# Patient Record
Sex: FEMALE | Race: WHITE | URBAN - METROPOLITAN AREA
[De-identification: names, ages, dates, MRNs, and addresses within clinical notes are randomized per-mention and may not be internally consistent; named-entity substitution may affect disease eponyms.]

---

## 2022-08-10 ENCOUNTER — APPOINTMENT (RX ONLY)
Dept: URBAN - METROPOLITAN AREA CLINIC 377 | Facility: CLINIC | Age: 87
Setting detail: DERMATOLOGY
End: 2022-08-10

## 2022-08-10 DIAGNOSIS — L82.0 INFLAMED SEBORRHEIC KERATOSIS: ICD-10-CM

## 2022-08-10 DIAGNOSIS — L81.4 OTHER MELANIN HYPERPIGMENTATION: ICD-10-CM | Status: STABLE

## 2022-08-10 DIAGNOSIS — D18.0 HEMANGIOMA: ICD-10-CM | Status: STABLE

## 2022-08-10 DIAGNOSIS — Z85.828 PERSONAL HISTORY OF OTHER MALIGNANT NEOPLASM OF SKIN: ICD-10-CM

## 2022-08-10 DIAGNOSIS — L82.1 OTHER SEBORRHEIC KERATOSIS: ICD-10-CM | Status: STABLE

## 2022-08-10 DIAGNOSIS — D22 MELANOCYTIC NEVI: ICD-10-CM | Status: STABLE

## 2022-08-10 DIAGNOSIS — Z71.89 OTHER SPECIFIED COUNSELING: ICD-10-CM

## 2022-08-10 PROBLEM — D22.5 MELANOCYTIC NEVI OF TRUNK: Status: ACTIVE | Noted: 2022-08-10

## 2022-08-10 PROBLEM — D18.01 HEMANGIOMA OF SKIN AND SUBCUTANEOUS TISSUE: Status: ACTIVE | Noted: 2022-08-10

## 2022-08-10 PROCEDURE — 99203 OFFICE O/P NEW LOW 30 MIN: CPT | Mod: 25

## 2022-08-10 PROCEDURE — ? COUNSELING

## 2022-08-10 PROCEDURE — ? SUNSCREEN TREATMENT REGIMEN

## 2022-08-10 PROCEDURE — ? LIQUID NITROGEN

## 2022-08-10 PROCEDURE — 17110 DESTRUCTION B9 LES UP TO 14: CPT

## 2022-08-10 ASSESSMENT — LOCATION SIMPLE DESCRIPTION DERM
LOCATION SIMPLE: UPPER BACK
LOCATION SIMPLE: RIGHT NOSE
LOCATION SIMPLE: LEFT CHEEK
LOCATION SIMPLE: RIGHT FOREHEAD
LOCATION SIMPLE: RIGHT CHEEK
LOCATION SIMPLE: LEFT UPPER BACK

## 2022-08-10 ASSESSMENT — LOCATION DETAILED DESCRIPTION DERM
LOCATION DETAILED: LEFT SUPERIOR CENTRAL BUCCAL CHEEK
LOCATION DETAILED: LEFT SUPERIOR UPPER BACK
LOCATION DETAILED: LEFT SUPERIOR MEDIAL UPPER BACK
LOCATION DETAILED: RIGHT LATERAL MANDIBULAR CHEEK
LOCATION DETAILED: SUPERIOR THORACIC SPINE
LOCATION DETAILED: RIGHT FOREHEAD
LOCATION DETAILED: LEFT MEDIAL UPPER BACK
LOCATION DETAILED: RIGHT NASAL SIDEWALL

## 2022-08-10 ASSESSMENT — LOCATION ZONE DERM
LOCATION ZONE: FACE
LOCATION ZONE: NOSE
LOCATION ZONE: TRUNK

## 2022-08-10 NOTE — PROCEDURE: MIPS QUALITY
Detail Level: Detailed
Quality 110: Preventive Care And Screening: Influenza Immunization: Influenza Immunization Administered during Influenza season
Quality 226: Preventive Care And Screening: Tobacco Use: Screening And Cessation Intervention: Patient screened for tobacco use and is an ex/non-smoker
Quality 111:Pneumonia Vaccination Status For Older Adults: Pneumococcal vaccine administered on or after patient’s 60th birthday and before the end of the measurement period
Quality 130: Documentation Of Current Medications In The Medical Record: Current Medications Documented
Quality 431: Preventive Care And Screening: Unhealthy Alcohol Use - Screening: Patient identified as an unhealthy alcohol user when screened for unhealthy alcohol use using a systematic screening method and received brief counseling
Detail Level: Simple
Detail Level: Generalized

## 2022-08-10 NOTE — PROCEDURE: LIQUID NITROGEN
Show Aperture Variable?: Yes
Add 52 Modifier (Optional): no
Consent: The patient's consent was obtained including but not limited to risks of crusting, scabbing, blistering, scarring, darker or lighter pigmentary change, recurrence, incomplete removal and infection.
Medical Necessity Information: It is in your best interest to select a reason for this procedure from the list below. All of these items fulfill various CMS LCD requirements except the new and changing color options.
Medical Necessity Clause: This procedure was medically necessary because the lesions that were treated were:
Detail Level: Detailed
Spray Paint Text: The liquid nitrogen was applied to the skin utilizing a spray paint frosting technique.
Post-Care Instructions: I reviewed with the patient in detail post-care instructions. Patient is to wear sunprotection, and avoid picking at any of the treated lesions. Pt may apply Vaseline to crusted or scabbing areas.

## 2022-08-31 ENCOUNTER — TELEPHONE (OUTPATIENT)
Dept: FAMILY MEDICINE CLINIC | Age: 87
End: 2022-08-31

## 2022-10-17 ENCOUNTER — OFFICE VISIT (OUTPATIENT)
Dept: FAMILY MEDICINE CLINIC | Age: 87
End: 2022-10-17
Payer: COMMERCIAL

## 2022-10-17 VITALS
SYSTOLIC BLOOD PRESSURE: 140 MMHG | TEMPERATURE: 96.6 F | OXYGEN SATURATION: 96 % | BODY MASS INDEX: 28.27 KG/M2 | DIASTOLIC BLOOD PRESSURE: 86 MMHG | HEART RATE: 89 BPM | HEIGHT: 64 IN | WEIGHT: 165.6 LBS

## 2022-10-17 DIAGNOSIS — F32.0 CURRENT MILD EPISODE OF MAJOR DEPRESSIVE DISORDER WITHOUT PRIOR EPISODE (HCC): ICD-10-CM

## 2022-10-17 DIAGNOSIS — Z87.19 HISTORY OF ESOPHAGEAL REFLUX: ICD-10-CM

## 2022-10-17 DIAGNOSIS — D68.51 FACTOR V LEIDEN MUTATION (HCC): ICD-10-CM

## 2022-10-17 DIAGNOSIS — Z86.711 HISTORY OF PULMONARY EMBOLUS (PE): ICD-10-CM

## 2022-10-17 DIAGNOSIS — M16.0 PRIMARY OSTEOARTHRITIS OF BOTH HIPS: Primary | ICD-10-CM

## 2022-10-17 DIAGNOSIS — M85.89 OSTEOPENIA OF MULTIPLE SITES: ICD-10-CM

## 2022-10-17 DIAGNOSIS — Z86.718 HISTORY OF RECURRENT DEEP VEIN THROMBOSIS (DVT): ICD-10-CM

## 2022-10-17 DIAGNOSIS — E53.8 B12 DEFICIENCY: ICD-10-CM

## 2022-10-17 DIAGNOSIS — Z76.89 ESTABLISHING CARE WITH NEW DOCTOR, ENCOUNTER FOR: ICD-10-CM

## 2022-10-17 PROBLEM — Z79.01 ANTICOAGULATED: Status: ACTIVE | Noted: 2022-10-17

## 2022-10-17 PROBLEM — F32.A DEPRESSION: Status: ACTIVE | Noted: 2022-10-17

## 2022-10-17 PROCEDURE — 99204 OFFICE O/P NEW MOD 45 MIN: CPT | Performed by: FAMILY MEDICINE

## 2022-10-17 PROCEDURE — 1123F ACP DISCUSS/DSCN MKR DOCD: CPT | Performed by: FAMILY MEDICINE

## 2022-10-17 RX ORDER — VENLAFAXINE HYDROCHLORIDE 37.5 MG/1
37.5 CAPSULE, EXTENDED RELEASE ORAL DAILY
Qty: 90 CAPSULE | Refills: 1 | Status: SHIPPED | OUTPATIENT
Start: 2022-10-17

## 2022-10-17 RX ORDER — VENLAFAXINE HYDROCHLORIDE 37.5 MG/1
CAPSULE, EXTENDED RELEASE ORAL
COMMUNITY
End: 2022-10-17 | Stop reason: SDUPTHER

## 2022-10-17 RX ORDER — RIVAROXABAN 20 MG/1
TABLET, FILM COATED ORAL
COMMUNITY
Start: 2022-09-13

## 2022-10-17 RX ORDER — ALENDRONATE SODIUM 70 MG/1
TABLET ORAL
COMMUNITY
Start: 2022-09-19 | End: 2022-10-17 | Stop reason: SDUPTHER

## 2022-10-17 RX ORDER — ALENDRONATE SODIUM 70 MG/1
TABLET ORAL
Qty: 12 TABLET | Refills: 3 | Status: SHIPPED | OUTPATIENT
Start: 2022-10-17

## 2022-10-18 PROBLEM — K57.92 DIVERTICULITIS: Status: ACTIVE | Noted: 2022-10-18

## 2022-10-18 PROBLEM — D68.51 FACTOR V LEIDEN MUTATION (HCC): Status: ACTIVE | Noted: 2022-10-18

## 2022-10-18 ASSESSMENT — PATIENT HEALTH QUESTIONNAIRE - PHQ9
3. TROUBLE FALLING OR STAYING ASLEEP: 0
SUM OF ALL RESPONSES TO PHQ QUESTIONS 1-9: 0
6. FEELING BAD ABOUT YOURSELF - OR THAT YOU ARE A FAILURE OR HAVE LET YOURSELF OR YOUR FAMILY DOWN: 0
SUM OF ALL RESPONSES TO PHQ QUESTIONS 1-9: 0
SUM OF ALL RESPONSES TO PHQ9 QUESTIONS 1 & 2: 0
SUM OF ALL RESPONSES TO PHQ QUESTIONS 1-9: 0
SUM OF ALL RESPONSES TO PHQ QUESTIONS 1-9: 0
2. FEELING DOWN, DEPRESSED OR HOPELESS: 0
1. LITTLE INTEREST OR PLEASURE IN DOING THINGS: 0
8. MOVING OR SPEAKING SO SLOWLY THAT OTHER PEOPLE COULD HAVE NOTICED. OR THE OPPOSITE, BEING SO FIGETY OR RESTLESS THAT YOU HAVE BEEN MOVING AROUND A LOT MORE THAN USUAL: 0
5. POOR APPETITE OR OVEREATING: 0
9. THOUGHTS THAT YOU WOULD BE BETTER OFF DEAD, OR OF HURTING YOURSELF: 0
4. FEELING TIRED OR HAVING LITTLE ENERGY: 0
7. TROUBLE CONCENTRATING ON THINGS, SUCH AS READING THE NEWSPAPER OR WATCHING TELEVISION: 0
10. IF YOU CHECKED OFF ANY PROBLEMS, HOW DIFFICULT HAVE THESE PROBLEMS MADE IT FOR YOU TO DO YOUR WORK, TAKE CARE OF THINGS AT HOME, OR GET ALONG WITH OTHER PEOPLE: 0

## 2022-10-18 NOTE — PROGRESS NOTES
Patient here to establish care. Patient recently moved here from [facility] [place]. Patient with multiple medical problems. First and foremost per patient is her osteoarthritis. She has osteoarthritis in both hips. Because she is on Xarelto for recurrent DVTs and PEs, (apparently patient has factor V Leyden deficiency) for she is unable to take NSAIDs, so she has been getting corticosteroid injections in both her hips about every 3 months through orthopedics. Because she recently moved, she needs a new referral to a local orthopedist.  Current pain is about 5/10 because its been about 8 or 9 months since her last injection. Patient with depression currently on Effexor 37.5 mg.  Her previous physician had her taking 2 tablets in the morning and 1 tablet in the evening. Patient denies any suicidal homicidal ideations. Her mood seems to be fairly well controlled on the current dose. Sleeping okay. Patient with osteopenia per patient. She has been on Fosamax for many years. She needs a refill. Tolerates well without side effects    Patient with a history of B12 deficiency for which she takes over-the-counter B12 medication. Feels well. Patient with a history of laryngeal pharyngeal reflux disease. She has not been taking medication for this for years and denies any symptoms currently. Patient with a history of recurrent diverticulitis for which she has had 2 colon surgeries. Has not had an attack in many years.     Past Surgical History:   Procedure Laterality Date    ANKLE FRACTURE SURGERY  1976    APPENDECTOMY Bilateral 06/16/2009    CATARACT REMOVAL WITH IMPLANT Bilateral 12/21/2009    CORNEAL TRANSPLANT      12/21/2009    CYSTOCELE REPAIR  06/16/2009    HEMORRHOID SURGERY      RECTOCELE REPAIR  06/16/2009    SIGMOID COLECTOMY  06/16/2009    SMALL INTESTINE SURGERY  06/16/2009    JUANJOSE AND BSO (CERVIX REMOVED)  06/16/2009     Past Medical History:   Diagnosis Date    Anticoagulated     B12 deficiency     Depression     Diverticulosis     History of esophageal reflux     History of pulmonary embolus (PE)     History of recurrent deep vein thrombosis (DVT)     HTN (hypertension)     IBS (irritable bowel syndrome)     OA (osteoarthritis) of hip      O:   Vitals:    10/17/22 1311   BP: (!) 140/86   Pulse: 89   Temp: (!) 96.6 °F (35.9 °C)   SpO2: 96%     No acute distress. Alert and Oriented x 3  HEENT: Atraumatic. Normocephalic. PERRLA, EOMI, Conjunctiva clear  NECK: without thyromegaly, lymphadenopathy, JVD  LUNGS:Clear to ascultation bilaterally. Breathing comfortably  CARDIOVASCULAR:  Regular rate and rhythm, no murmurs, rubs, or gallops  EXTREMITY: Full range of motion. No clubbing/cyanosis/edema  SKIN: Warm, Dry, No rash. PSYCH: Mood and Affect normal.    A:    Diagnosis Orders   1. Primary osteoarthritis of both hips  [address] Snyder DO, Orthopedic Surgery, [place]      2. Current mild episode of major depressive disorder without prior episode (HCC)  venlafaxine (EFFEXOR XR) 37.5 MG extended release capsule      3. Osteopenia of multiple sites  alendronate (FOSAMAX) 70 MG tablet      4. Factor V Leiden mutation (HCC)  rivaroxaban (XARELTO) 20 MG TABS tablet      5. History of recurrent deep vein thrombosis (DVT)  rivaroxaban (XARELTO) 20 MG TABS tablet      6. History of pulmonary embolus (PE)  rivaroxaban (XARELTO) 20 MG TABS tablet      7. History of esophageal reflux        8. B12 deficiency        9. Establishing care with new doctor, encounter for          P: Continue all meds at their current dose. Consult to orthopedics for hip osteoarthritis.   Follow-up 6 months

## 2022-10-27 ENCOUNTER — OFFICE VISIT (OUTPATIENT)
Dept: ORTHOPEDIC SURGERY | Age: 87
End: 2022-10-27
Payer: COMMERCIAL

## 2022-10-27 VITALS
SYSTOLIC BLOOD PRESSURE: 128 MMHG | RESPIRATION RATE: 16 BRPM | WEIGHT: 167 LBS | OXYGEN SATURATION: 94 % | BODY MASS INDEX: 28.51 KG/M2 | HEIGHT: 64 IN | DIASTOLIC BLOOD PRESSURE: 70 MMHG | HEART RATE: 72 BPM

## 2022-10-27 DIAGNOSIS — M16.12 ARTHRITIS OF LEFT HIP: Primary | ICD-10-CM

## 2022-10-27 DIAGNOSIS — M16.11 ARTHRITIS OF RIGHT HIP: ICD-10-CM

## 2022-10-27 PROCEDURE — 1123F ACP DISCUSS/DSCN MKR DOCD: CPT | Performed by: PHYSICIAN ASSISTANT

## 2022-10-27 PROCEDURE — 99202 OFFICE O/P NEW SF 15 MIN: CPT | Performed by: PHYSICIAN ASSISTANT

## 2022-10-27 NOTE — PROGRESS NOTES
Review of Systems   Constitutional: Negative. HENT: Negative. Eyes: Negative. Respiratory: Negative. Cardiovascular: Negative. Gastrointestinal: Negative. Genitourinary: Negative. Musculoskeletal:  Positive for arthralgias and myalgias. Skin: Negative. Neurological: Negative. Psychiatric/Behavioral: Negative. Ailyn Thompson is an 80 y.o. female who presents to the office today with bilateral hip pain that she has had for years. She states that in the past she has had injections by an orthopedic surgeon and that those injections were done under x-ray guidance into the hip joints. She states that she was told she was not a good candidate for surgery due to her blood clotting disorder. She has a history of a DVT in the left leg. She has not had a fall or anything recent injury wise but continues to have pain in both hips. She rates her pain at a 7/10, aching in nature. Past Medical History:   Diagnosis Date    Anticoagulated     B12 deficiency     Depression     Diverticulosis     History of esophageal reflux     History of pulmonary embolus (PE)     History of recurrent deep vein thrombosis (DVT)     HTN (hypertension)     IBS (irritable bowel syndrome)     OA (osteoarthritis) of hip        Past Surgical History:   Procedure Laterality Date    ANKLE FRACTURE SURGERY  1976    APPENDECTOMY Bilateral 06/16/2009    CATARACT REMOVAL WITH IMPLANT Bilateral 12/21/2009    CORNEAL TRANSPLANT      12/21/2009    CYSTOCELE REPAIR  06/16/2009    HEMORRHOID SURGERY      RECTOCELE REPAIR  06/16/2009    SIGMOID COLECTOMY  06/16/2009    SMALL INTESTINE SURGERY  06/16/2009    JUANJOSE AND BSO (CERVIX REMOVED)  06/16/2009       History reviewed. No pertinent family history.     Social History     Socioeconomic History    Marital status: Unknown     Spouse name: None    Number of children: None    Years of education: None    Highest education level: None   Tobacco Use    Smoking status: Never    Smokeless tobacco: Never   Vaping Use    Vaping Use: Never used   Substance and Sexual Activity    Alcohol use: Never    Drug use: Never       Current Outpatient Medications   Medication Sig Dispense Refill    Calcium Carbonate-Vitamin D (CALCIUM-VITAMIN D) 600-125 MG-UNIT TABS       Multiple Vitamin (MULTI-VITAMIN DAILY PO)       venlafaxine (EFFEXOR XR) 37.5 MG extended release capsule Take 1 capsule by mouth daily 90 capsule 1    rivaroxaban (XARELTO) 20 MG TABS tablet Take 1 tablet by mouth daily (with breakfast) 90 tablet 1    alendronate (FOSAMAX) 70 MG tablet 1 TABLET ORALLY ONCE A WEEK 90 DAYS 12 tablet 3    XARELTO 20 MG TABS tablet TAKE 1 TABLET BY MOUTH EVERY DAY WITH FOOD FOR 30 DAYS (Patient not taking: Reported on 10/27/2022)       No current facility-administered medications for this visit. Allergies   Allergen Reactions    Codeine Other (See Comments)    Vitamin K     Penicillin G Rash     Event:        Sulfa Antibiotics Rash     Event:         Review of Systems:  See above      Physical Exam:   /70 (Site: Left Upper Arm, Position: Sitting, Cuff Size: Medium Adult)   Pulse 72   Resp 16   Ht 5' 3.5\" (1.613 m)   Wt 167 lb (75.8 kg)   SpO2 94%   BMI 29.12 kg/m²        Gait is Antalgic. Gen/Psych:Examination reveals a pleasant individual in no acute distress. The patient is oriented to time, place and person. The patient's mood and affect are appropriate. Lymph: The lymphatic examination bilaterally reveals all areas to be without enlargement or induration. Skin intact without lymphadenopathy, discoloration, or abnormal temperature. Vascular: There is intact, symmetric circulation in both lower extremities. Bilateral hip exam:  No significant tenderness to palpation over the left or right greater trochanter. Leg lengths are approximately equal.  Patient does have pain with internal and external rotation of both hips.   There is approximately 20 degrees external rotation and 10 degrees internal rotation of both hips. 5/5 hip abduction, 5/5 hip adduction, 5-/5 hip flexion. Outsiderecord review: Medical record reviewed    Imaging studies:  X-rays of the left hip and of the right hip were taken and reviewed in the office today. X-rays show moderate degenerative change in both hips with joint space loss, osteophyte formation and subchondral sclerosis. No acute fractures or dislocations of either hip noted. The official read and interpretation of these x-rays will be done by the the [facility]           Impression:     Diagnosis Orders   1. Arthritis of left hip  IR ARTHROCENTESIS MAJOR JT LEFT      2.  Arthritis of right hip  IR ARTHROCENTESIS MAJOR JT RIGHT              Plan:    Patient Instructions   IR hip injections ordered for both hips   Follow up in 6 weeks

## 2022-10-30 ASSESSMENT — ENCOUNTER SYMPTOMS
RESPIRATORY NEGATIVE: 1
EYES NEGATIVE: 1
GASTROINTESTINAL NEGATIVE: 1

## 2022-11-04 ENCOUNTER — TELEPHONE (OUTPATIENT)
Dept: ORTHOPEDIC SURGERY | Age: 87
End: 2022-11-04

## 2022-12-20 NOTE — PROGRESS NOTES
IR Procedure at [facility]:  Left message for patient to arrive at 0930 at [facility] on 12/28/2022 for her procedure at 1130, also went over below instructions. Ask patient to check with her Dr About Xavier Aguilar. I will check back with her tomorrow. ADDENDUM: Patient's daughter called back and stated that patient is supposed to have both hips injected, I told her to call the Dr's office that ordered procedure to make sure they let scheduling know. And she will also check about stopping xarelto. NPO at Midnight     (Paracentesis, Thoracentesis, Thyroid Bx and Injections may have a light breakfast)  2. Follow your directions as prescribed by the doctor for your procedure and medications. 3.   Consult your provider as to when to stop blood thinner  4. Do not take any pain medication within 6 hours of your procedure  5. Do not drink any alcoholic beverages or use any street drugs 24 hours before procedure. 6.   Please wear simple, loose fitting clothing to the hospital.  Do not bring valuables (money,             credit cards, checkbooks, etc.)     7. If you  have a Living Will and Durable Power of  for Healthcare, please bring in a copy. 8.   Please bring picture ID,  insurance card, paperwork from the doctors office            (H & P, Consent,  & card for implantable devices). 9.   Report to the information desk on the ground floor. 10. Take a shower the night before or morning of your procedure, do not apply any lotion, oil or powder. 11. If you are going to be sedated for the procedure, you will need a responsible adult to drive you home.

## 2022-12-27 NOTE — PROGRESS NOTES
IR Procedure at [address] [place] [address]:  Spoke with patient's daughter Vasiliy Santos and patient will arrive at 0730 at [address] [place] [address] on 12/30/2022 for her procedure at 0930. Also went over below instructions. Patient will take her last dose of xarelto on 12/28/2022, per patient's daughter. NPO at Midnight     (Paracentesis, Thoracentesis, Thyroid Bx and Injections may have a light breakfast)  2. Follow your directions as prescribed by the doctor for your procedure and medications. 3.   Consult your provider as to when to stop blood thinner  4. Do not take any pain medication within 6 hours of your procedure  5. Do not drink any alcoholic beverages or use any street drugs 24 hours before procedure. 6.   Please wear simple, loose fitting clothing to the hospital.  Do not bring valuables (money,             credit cards, checkbooks, etc.)     7. If you  have a Living Will and Durable Power of  for Healthcare, please bring in a copy. 8.   Please bring picture ID,  insurance card, paperwork from the doctors office            (H & P, Consent,  & card for implantable devices). 9.   Report to the information desk on the ground floor. 10. Take a shower the night before or morning of your procedure, do not apply any lotion, oil or powder. 11. If you are going to be sedated for the procedure, you will need a responsible adult to drive you home.

## 2022-12-28 ENCOUNTER — HOSPITAL ENCOUNTER (OUTPATIENT)
Dept: INTERVENTIONAL RADIOLOGY/VASCULAR | Age: 87
Discharge: HOME | End: 2022-12-28

## 2022-12-30 ENCOUNTER — HOSPITAL ENCOUNTER (OUTPATIENT)
Dept: INTERVENTIONAL RADIOLOGY/VASCULAR | Age: 87
Discharge: HOME | End: 2022-12-30

## 2022-12-30 NOTE — PROGRESS NOTES
Spoke with patient's daughter Shirley Quiroz and patient is not coming for her procedure, she did not stop her xarelto. IR notified.

## 2023-02-20 ENCOUNTER — APPOINTMENT (OUTPATIENT)
Dept: CT IMAGING | Age: 88
DRG: 065 | End: 2023-02-20
Payer: MEDICARE

## 2023-02-20 ENCOUNTER — HOSPITAL ENCOUNTER (INPATIENT)
Age: 88
LOS: 2 days | Discharge: HOME | DRG: 065 | End: 2023-02-22
Attending: EMERGENCY MEDICINE | Admitting: STUDENT IN AN ORGANIZED HEALTH CARE EDUCATION/TRAINING PROGRAM
Payer: MEDICARE

## 2023-02-20 ENCOUNTER — APPOINTMENT (OUTPATIENT)
Dept: GENERAL RADIOLOGY | Age: 88
DRG: 065 | End: 2023-02-20
Payer: MEDICARE

## 2023-02-20 DIAGNOSIS — R42 LIGHTHEADEDNESS: ICD-10-CM

## 2023-02-20 DIAGNOSIS — I16.1 HYPERTENSIVE EMERGENCY, NO CHF: Primary | ICD-10-CM

## 2023-02-20 LAB
ANION GAP SERPL CALCULATED.3IONS-SCNC: 15 MMOL/L (ref 4–16)
ATYPICAL LYMPHOCYTE ABSOLUTE COUNT: ABNORMAL
BANDED NEUTROPHILS ABSOLUTE COUNT: 0.14 K/CU MM
BANDED NEUTROPHILS RELATIVE PERCENT: 1 % (ref 5–11)
BUN SERPL-MCNC: 22 MG/DL (ref 6–23)
CALCIUM SERPL-MCNC: 10 MG/DL (ref 8.3–10.6)
CHLORIDE BLD-SCNC: 101 MMOL/L (ref 99–110)
CO2: 21 MMOL/L (ref 21–32)
CREAT SERPL-MCNC: 0.7 MG/DL (ref 0.6–1.1)
DIFFERENTIAL TYPE: ABNORMAL
EKG ATRIAL RATE: 84 BPM
EKG DIAGNOSIS: NORMAL
EKG P AXIS: 39 DEGREES
EKG P-R INTERVAL: 200 MS
EKG Q-T INTERVAL: 410 MS
EKG QRS DURATION: 108 MS
EKG QTC CALCULATION (BAZETT): 484 MS
EKG R AXIS: -15 DEGREES
EKG T AXIS: 41 DEGREES
EKG VENTRICULAR RATE: 84 BPM
GFR SERPL CREATININE-BSD FRML MDRD: >60 ML/MIN/1.73M2
GLUCOSE SERPL-MCNC: 111 MG/DL (ref 70–99)
HCT VFR BLD CALC: 50.3 % (ref 37–47)
HEMOGLOBIN: 16.4 GM/DL (ref 12.5–16)
LYMPHOCYTES ABSOLUTE: 2.4 K/CU MM
LYMPHOCYTES RELATIVE PERCENT: 17 % (ref 24–44)
MCH RBC QN AUTO: 29.9 PG (ref 27–31)
MCHC RBC AUTO-ENTMCNC: 32.6 % (ref 32–36)
MCV RBC AUTO: 91.6 FL (ref 78–100)
MONOCYTES ABSOLUTE: 1.7 K/CU MM
MONOCYTES RELATIVE PERCENT: 12 % (ref 0–4)
PDW BLD-RTO: 14.6 % (ref 11.7–14.9)
PLATELET # BLD: 346 K/CU MM (ref 140–440)
PLT MORPHOLOGY: ABNORMAL
PMV BLD AUTO: 10.7 FL (ref 7.5–11.1)
POTASSIUM SERPL-SCNC: 4 MMOL/L (ref 3.5–5.1)
RBC # BLD: 5.49 M/CU MM (ref 4.2–5.4)
SEGMENTED NEUTROPHILS ABSOLUTE COUNT: 10.2 K/CU MM
SEGMENTED NEUTROPHILS RELATIVE PERCENT: 70 % (ref 36–66)
SODIUM BLD-SCNC: 137 MMOL/L (ref 135–145)
TROPONIN T: <0.01 NG/ML
TROPONIN T: <0.01 NG/ML
WBC # BLD: 14.4 K/CU MM (ref 4–10.5)

## 2023-02-20 PROCEDURE — 70498 CT ANGIOGRAPHY NECK: CPT

## 2023-02-20 PROCEDURE — 99285 EMERGENCY DEPT VISIT HI MDM: CPT

## 2023-02-20 PROCEDURE — 93005 ELECTROCARDIOGRAM TRACING: CPT | Performed by: EMERGENCY MEDICINE

## 2023-02-20 PROCEDURE — 93010 ELECTROCARDIOGRAM REPORT: CPT | Performed by: INTERNAL MEDICINE

## 2023-02-20 PROCEDURE — 6370000000 HC RX 637 (ALT 250 FOR IP): Performed by: EMERGENCY MEDICINE

## 2023-02-20 PROCEDURE — 92610 EVALUATE SWALLOWING FUNCTION: CPT

## 2023-02-20 PROCEDURE — 85027 COMPLETE CBC AUTOMATED: CPT

## 2023-02-20 PROCEDURE — 6360000002 HC RX W HCPCS: Performed by: EMERGENCY MEDICINE

## 2023-02-20 PROCEDURE — 6360000004 HC RX CONTRAST MEDICATION: Performed by: STUDENT IN AN ORGANIZED HEALTH CARE EDUCATION/TRAINING PROGRAM

## 2023-02-20 PROCEDURE — 94761 N-INVAS EAR/PLS OXIMETRY MLT: CPT

## 2023-02-20 PROCEDURE — 85007 BL SMEAR W/DIFF WBC COUNT: CPT

## 2023-02-20 PROCEDURE — 6370000000 HC RX 637 (ALT 250 FOR IP): Performed by: NURSE PRACTITIONER

## 2023-02-20 PROCEDURE — 6370000000 HC RX 637 (ALT 250 FOR IP): Performed by: STUDENT IN AN ORGANIZED HEALTH CARE EDUCATION/TRAINING PROGRAM

## 2023-02-20 PROCEDURE — 71045 X-RAY EXAM CHEST 1 VIEW: CPT

## 2023-02-20 PROCEDURE — 36415 COLL VENOUS BLD VENIPUNCTURE: CPT

## 2023-02-20 PROCEDURE — 84484 ASSAY OF TROPONIN QUANT: CPT

## 2023-02-20 PROCEDURE — 70450 CT HEAD/BRAIN W/O DYE: CPT

## 2023-02-20 PROCEDURE — 87040 BLOOD CULTURE FOR BACTERIA: CPT

## 2023-02-20 PROCEDURE — 1200000000 HC SEMI PRIVATE

## 2023-02-20 PROCEDURE — 80048 BASIC METABOLIC PNL TOTAL CA: CPT

## 2023-02-20 PROCEDURE — 99223 1ST HOSP IP/OBS HIGH 75: CPT | Performed by: PSYCHIATRY & NEUROLOGY

## 2023-02-20 PROCEDURE — 96374 THER/PROPH/DIAG INJ IV PUSH: CPT

## 2023-02-20 RX ORDER — ONDANSETRON 2 MG/ML
4 INJECTION INTRAMUSCULAR; INTRAVENOUS EVERY 6 HOURS PRN
Status: DISCONTINUED | OUTPATIENT
Start: 2023-02-20 | End: 2023-02-22 | Stop reason: HOSPADM

## 2023-02-20 RX ORDER — ONDANSETRON 4 MG/1
4 TABLET, ORALLY DISINTEGRATING ORAL EVERY 8 HOURS PRN
Status: DISCONTINUED | OUTPATIENT
Start: 2023-02-20 | End: 2023-02-22 | Stop reason: HOSPADM

## 2023-02-20 RX ORDER — ROSUVASTATIN CALCIUM 20 MG/1
40 TABLET, COATED ORAL NIGHTLY
Status: DISCONTINUED | OUTPATIENT
Start: 2023-02-20 | End: 2023-02-22 | Stop reason: HOSPADM

## 2023-02-20 RX ORDER — ASPIRIN 300 MG/1
300 SUPPOSITORY RECTAL DAILY
Status: DISCONTINUED | OUTPATIENT
Start: 2023-02-20 | End: 2023-02-22 | Stop reason: HOSPADM

## 2023-02-20 RX ORDER — LANOLIN ALCOHOL/MO/W.PET/CERES
3 CREAM (GRAM) TOPICAL ONCE
Status: COMPLETED | OUTPATIENT
Start: 2023-02-20 | End: 2023-02-20

## 2023-02-20 RX ORDER — HYDRALAZINE HYDROCHLORIDE 20 MG/ML
10 INJECTION INTRAMUSCULAR; INTRAVENOUS ONCE
Status: COMPLETED | OUTPATIENT
Start: 2023-02-20 | End: 2023-02-20

## 2023-02-20 RX ORDER — ACETAMINOPHEN 500 MG
1000 TABLET ORAL ONCE
Status: COMPLETED | OUTPATIENT
Start: 2023-02-20 | End: 2023-02-20

## 2023-02-20 RX ORDER — ROSUVASTATIN CALCIUM 20 MG/1
40 TABLET, COATED ORAL NIGHTLY
Status: DISCONTINUED | OUTPATIENT
Start: 2023-02-20 | End: 2023-02-20

## 2023-02-20 RX ORDER — POLYETHYLENE GLYCOL 3350 17 G/17G
17 POWDER, FOR SOLUTION ORAL DAILY PRN
Status: DISCONTINUED | OUTPATIENT
Start: 2023-02-20 | End: 2023-02-22 | Stop reason: HOSPADM

## 2023-02-20 RX ORDER — ASPIRIN 81 MG/1
81 TABLET ORAL DAILY
Status: DISCONTINUED | OUTPATIENT
Start: 2023-02-20 | End: 2023-02-22 | Stop reason: HOSPADM

## 2023-02-20 RX ORDER — VENLAFAXINE HYDROCHLORIDE 37.5 MG/1
37.5 CAPSULE, EXTENDED RELEASE ORAL DAILY
Status: DISCONTINUED | OUTPATIENT
Start: 2023-02-20 | End: 2023-02-22 | Stop reason: HOSPADM

## 2023-02-20 RX ADMIN — ACETAMINOPHEN 1000 MG: 500 TABLET ORAL at 03:11

## 2023-02-20 RX ADMIN — Medication 3 MG: at 20:47

## 2023-02-20 RX ADMIN — ASPIRIN 81 MG: 81 TABLET, COATED ORAL at 13:14

## 2023-02-20 RX ADMIN — HYDRALAZINE HYDROCHLORIDE 10 MG: 20 INJECTION INTRAMUSCULAR; INTRAVENOUS at 02:39

## 2023-02-20 RX ADMIN — ROSUVASTATIN CALCIUM 40 MG: 20 TABLET, COATED ORAL at 20:47

## 2023-02-20 RX ADMIN — VENLAFAXINE HYDROCHLORIDE 37.5 MG: 37.5 CAPSULE, EXTENDED RELEASE ORAL at 15:19

## 2023-02-20 RX ADMIN — RIVAROXABAN 20 MG: 20 TABLET, FILM COATED ORAL at 13:14

## 2023-02-20 RX ADMIN — IOPAMIDOL 80 ML: 755 INJECTION, SOLUTION INTRAVENOUS at 11:30

## 2023-02-20 ASSESSMENT — ENCOUNTER SYMPTOMS
SORE THROAT: 0
COUGH: 0
NAUSEA: 0
SINUS PAIN: 0
BACK PAIN: 0
DIARRHEA: 0
EYES NEGATIVE: 1
SINUS PRESSURE: 0
ABDOMINAL PAIN: 0
VOMITING: 0
RESPIRATORY NEGATIVE: 1
SHORTNESS OF BREATH: 0
CONSTIPATION: 0
GASTROINTESTINAL NEGATIVE: 1
COLOR CHANGE: 0
WHEEZING: 0

## 2023-02-20 NOTE — H&P
V2.0  History and Physical      Name:  Lyric Reyes /Age/Sex: 1933  (80 y.o. female)   MRN & CSN:  [id number] & [id number] Encounter Date/Time: 2023 11:12 AM EST   Location:  7957/7089-[id number] PCP: Stefanie Gibson, [address] [facility] Day: 1    History from:     patient    History of Present Illness:     Chief Complaint: Hypertensive emergency    Lyric Reyes is a 80 y.o. female with pmh of factor V Leiden with prior PE/DVTs [on Xarelto], who presents with dizziness, blurry vision unsteady gait, and a headache. Patient states that the symptoms been ongoing for the past 2 days. Patient denies any falls and has not hit her head. At baseline she does not take any medications nor has she for blood pressure. On arrival patient was found to be hypertensive to the systolic of 774L over 475E. She was given a one-time dose of IV hydralazine that brought her blood pressure down into a systolic in the 846Z. Patient stated that this helped her headache. As patient does have a history of multiple clots there is concern for stroke. She did have a CT head which is reassuring that it was negative as her symptoms have been ongoing for the past 2 days. The only other symptom the patient does endorse that she has had some increased urinary frequency without burning. Assessment and Plan:   Lyric Reyes is a 80 y.o. female with a pmh of factor V Leiden with prior PE/DVTs [on Xarelto], who presents with dizziness, blurry vision unsteady gait, and a headache who presents with Hypertensive emergency    Hypertensive emergency  Patient with a presenting blood pressure in the systolics of 116E. Does have associated blurry vision, dizziness, and unsteady gait.   Not on any blood pressure medications at baseline  Cardene drip titrate to blood pressure of 180  We will allow her to remain hypertensive and neurology for at least the next 24 hours  Avoid dropping her blood pressure as this could result in worsening of a stroke if there is a clot. Dizziness with unsteady gait, R/O CVA  Differential includes stroke or due to hypertensive emergency. The order of operations areas with support and as a clot could cause her to be hypertensive. CT head was okay  Neurology consulted  CTA was ordered stat to visualize vasculature and see if there is a clot. MRI brain ordered  Start patient on aspirin statin  Patient having a blood pressure of 180 being targeted for hypertensive emergency. Still want her to be unregulated device because of bleed nor too low and resulted in a larger stroke. Factor V Leyden  History of multiple DVTs as well as a PE  Continue Xarelto    ? Urinary tract infection  Patient with increased urinary frequency without burning. Patient does have an elevated white count of 14. But denies fever or chills. We will check a UA and if positive initiate treatment with IV antibiotics. Disposition:   Current Living situation: Home  Expected Disposition: Home   Estimated D/C: 3-4 days    Diet Diet NPO   DVT Prophylaxis [] Lovenox, []  Heparin, [] SCDs, [] Ambulation,  [] Eliquis, [x] Xarelto   Code Status Full Code   Surrogate Decision Maker/ POA       Review of Systems: Need 10 Elements   Review of Systems   Constitutional:  Negative for activity change, appetite change, chills, fatigue and fever. HENT:  Negative for congestion, sinus pressure, sinus pain and sore throat. Eyes:  Negative for visual disturbance. Respiratory:  Negative for cough, shortness of breath and wheezing. Cardiovascular:  Negative for chest pain, palpitations and leg swelling. Gastrointestinal:  Negative for abdominal pain, constipation, diarrhea, nausea and vomiting. Endocrine: Negative for polydipsia and polyuria. Genitourinary:  Negative for dysuria. Musculoskeletal:  Negative for arthralgias and back pain. Skin:  Negative for color change.    Neurological:  Positive for dizziness, weakness and headaches. Psychiatric/Behavioral:  Negative for agitation, behavioral problems and confusion. Objective:   No intake or output data in the 24 hours ending 02/20/23 1112   Vitals:   Vitals:    02/20/23 0800 02/20/23 0944 02/20/23 1000 02/20/23 1104   BP: (!) 171/88 (!) 175/75 (!) 171/88    Pulse: 84 98 83    Resp: 17 14 16    Temp:  98 °F (36.7 °C)     TempSrc:  Oral     SpO2: 95% 96% 94%    Weight:       Height:    5' 3\" (1.6 m)       Medications Prior to Admission     Prior to Admission medications    Medication Sig Start Date End Date Taking? Authorizing Provider   Calcium Carbonate-Vitamin D (CALCIUM-VITAMIN D) 600-125 MG-UNIT TABS     Historical Provider, MD   XARELTO 20 MG TABS tablet TAKE 1 TABLET BY MOUTH EVERY DAY WITH FOOD FOR 30 DAYS  Patient not taking: Reported on 10/27/2022 9/13/22   Historical Provider, MD   Multiple Vitamin (MULTI-VITAMIN DAILY PO)     Historical Provider, MD   venlafaxine (EFFEXOR XR) 37.5 MG extended release capsule Take 1 capsule by mouth daily 10/17/22   Sadia Adame MD   rivaroxaban Jerlyn Orf) 20 MG TABS tablet Take 1 tablet by mouth daily (with breakfast) 10/17/22   Sadia Adame MD   alendronate (FOSAMAX) 70 MG tablet 1 TABLET ORALLY ONCE A WEEK 90 DAYS 10/17/22   Sadia Adame MD       Physical Exam: Need 8 Elements     Physical Exam  Vitals reviewed. Constitutional:       Appearance: Normal appearance. She is normal weight. HENT:      Head: Normocephalic and atraumatic. Mouth/Throat:      Mouth: Mucous membranes are moist.      Pharynx: Oropharynx is clear. Eyes:      Extraocular Movements: Extraocular movements intact. Conjunctiva/sclera: Conjunctivae normal.      Pupils: Pupils are equal, round, and reactive to light. Cardiovascular:      Rate and Rhythm: Normal rate and regular rhythm. Pulses: Normal pulses. Heart sounds: Normal heart sounds.    Pulmonary:      Effort: Pulmonary effort is normal.      Breath sounds: Normal breath sounds. Abdominal:      General: Abdomen is flat. Bowel sounds are normal.      Palpations: Abdomen is soft. Musculoskeletal:         General: No swelling. Normal range of motion. Cervical back: Normal range of motion and neck supple. Skin:     General: Skin is warm and dry. Neurological:      General: No focal deficit present. Mental Status: She is alert and oriented to person, place, and time. Mental status is at baseline. Psychiatric:         Mood and Affect: Mood normal.         Behavior: Behavior normal.         Thought Content: Thought content normal.         Judgment: Judgment normal.       Past Medical History:   PMHx   Past Medical History:   Diagnosis Date    Anticoagulated     B12 deficiency     Depression     Diverticulosis     History of esophageal reflux     History of pulmonary embolus (PE)     History of recurrent deep vein thrombosis (DVT)     HTN (hypertension)     IBS (irritable bowel syndrome)     OA (osteoarthritis) of hip      PSHX:  has a past surgical history that includes Total abdominal hysterectomy w/ bilateral salpingoophorectomy (06/16/2009); Cataract removal with implant (Bilateral, 12/21/2009); Corneal transplant; Hemorrhoid surgery; Ankle fracture surgery (1976); Cystocele repair (06/16/2009); Rectocele repair (06/16/2009); Appendectomy (Bilateral, 06/16/2009); Small intestine surgery (06/16/2009); and Sigmoid Colectomy (06/16/2009). Allergies: Allergies   Allergen Reactions    Codeine Other (See Comments)    Vitamin K     Penicillin G Rash     Event:        Sulfa Antibiotics Rash     Event:       Fam HX:  family history is not on file.   Soc HX:   Social History     Socioeconomic History    Marital status: Unknown     Spouse name: None    Number of children: None    Years of education: None    Highest education level: None   Tobacco Use    Smoking status: Never    Smokeless tobacco: Never   Vaping Use    Vaping Use: Never used   Substance and Sexual Activity Alcohol use: Never    Drug use: Never       Medications:   Medications:    venlafaxine  37.5 mg Oral Daily    aspirin  81 mg Oral Daily    Or    aspirin  300 mg Rectal Daily    rivaroxaban  20 mg Oral Daily with breakfast    rosuvastatin  40 mg Oral Nightly      Infusions:    niCARdipine       PRN Meds: ondansetron, 4 mg, Q8H PRN   Or  ondansetron, 4 mg, Q6H PRN  polyethylene glycol, 17 g, Daily PRN        Labs      CBC:   Recent Labs     02/20/23  0235   WBC 14.4*   HGB 16.4*        BMP:    Recent Labs     02/20/23  0235      K 4.0      CO2 21   BUN 22   CREATININE 0.7   GLUCOSE 111*     Hepatic: No results for input(s): AST, ALT, ALB, BILITOT, ALKPHOS in the last 72 hours. Lipids: No results found for: CHOL, HDL, TRIG  Hemoglobin A1C: No results found for: LABA1C  TSH: No results found for: TSH  Troponin:   Lab Results   Component Value Date/Time    TROPONINT <0.010 02/20/2023 02:35 AM     Lactic Acid: No results for input(s): LACTA in the last 72 hours. BNP: No results for input(s): PROBNP in the last 72 hours. UA:No results found for: Teresia Racer, PHUR, LABCAST, WBCUA, RBCUA, MUCUS, TRICHOMONAS, YEAST, BACTERIA, CLARITYU, SPECGRAV, LEUKOCYTESUR, UROBILINOGEN, BILIRUBINUR, BLOODU, GLUCOSEU, KETUA, AMORPHOUS  Urine Cultures: No results found for: LABURIN  Blood Cultures: No results found for: BC  No results found for: BLOODCULT2  Organism: No results found for: ORG    Imaging/Diagnostics Last 24 Hours   CT HEAD WO CONTRAST    Result Date: 2/20/2023  EXAMINATION: CT OF THE HEAD WITHOUT CONTRAST  2/20/2023 3:03 am TECHNIQUE: CT of the head was performed without the administration of intravenous contrast. Automated exposure control, iterative reconstruction, and/or weight based adjustment of the mA/kV was utilized to reduce the radiation dose to as low as reasonably achievable. COMPARISON: None.  HISTORY: ORDERING SYSTEM PROVIDED HISTORY: head pain TECHNOLOGIST PROVIDED HISTORY: Has a \"code stroke\" or \"stroke alert\" been called? ->No Reason for exam:->head pain Reason for Exam: Head pain, High BP FINDINGS: BRAIN/VENTRICLES: There is no acute intracranial hemorrhage, mass effect or midline shift. No abnormal extra-axial fluid collection. The gray-white differentiation is maintained without evidence of an acute infarct. There is no evidence of hydrocephalus. Moderate periventricular and deep subcortical white matter hypodensity is present. Diffuse atrophy is noted. ORBITS: The visualized portion of the orbits demonstrate no acute abnormality. SINUSES: The visualized paranasal sinuses and mastoid air cells demonstrate no acute abnormality. SOFT TISSUES/SKULL:  No acute abnormality of the visualized skull or soft tissues. No acute intracranial abnormality. Age related changes including chronic small vessel ischemic disease and cerebral atrophy. XR CHEST PORTABLE    Result Date: 2/20/2023  EXAMINATION: ONE XRAY VIEW OF THE CHEST 2/20/2023 3:01 am COMPARISON: None. HISTORY: ORDERING SYSTEM PROVIDED HISTORY: chest pain TECHNOLOGIST PROVIDED HISTORY: Reason for exam:->chest pain Reason for Exam: chest pain FINDINGS: No lines or tubes. Normal cardiomediastinal silhouette. The lungs are clear without focal consolidation or pleural effusion. No suspicious pulmonary nodules. No pulmonary edema. No pneumothorax. No acute osseous abnormality. No acute cardiopulmonary disease. Personally reviewed Lab Studies, Imaging, and discussed case with patient    Comment: Please note this report has been produced using speech recognition software and may contain errors related to that system including errors in grammar, punctuation, and spelling, as well as words and phrases that may be inappropriate. If there are any questions or concerns please feel free to contact the dictating provider for clarification.     Electronically signed by Tigre Yoder MD on 2/20/2023 at 11:12 AM

## 2023-02-20 NOTE — PLAN OF CARE
Problem: Discharge Planning  Goal: Discharge to home or other facility with appropriate resources  Outcome: Progressing     Problem: Safety - Adult  Goal: Free from fall injury  Outcome: Progressing     Problem: ABCDS Injury Assessment  Goal: Absence of physical injury  Outcome: Progressing   Blood pressor decreasing on own.

## 2023-02-20 NOTE — CONSULTS
Neurology Service Consult Note  [address]   Patient Name: Bozena Maldonado  : 1933        Subjective:   Reason for consult: Stroke v HTN emergency with complaints of headache and unsteadiness  80 y.o. female  with history of B12 deficiency, depression, dierticulitis, IBS, HTN, factor V Leiden, DVT/PE on Xarelto presenting to [place] [address] with a 2 day history of dizziness, headache and \"fuzzy feeling\" in her head. Patient reports that she has not slept well for several nights. While she does go to sleep she doesn't feel that she is fully asleep. She states that she is also had a bifrontal/temporal headache and just feels like she is not thinking clearly. The patient states that she has not been on any antihypertensives prior to this hospitalization and her PCP was managing her blood pressure remotely. It is had a steady increase over the last year or so. Patient is also struggling emotionally as her  passed away last September and their wedding anniversary was just on . She is relocated from [facility] down to the [facility] to be closer to her daughter and is living in assisted living. She states that she likes it there and she has made lots of friends. As blood pressure has been normalizing, the patient states that she is feeling much better. She denies any vision changes, focal or lateralizing weakness or sensory changes. She did not have any loss of consciousness.       Past Medical History:   Diagnosis Date    Anticoagulated     B12 deficiency     Depression     Diverticulosis     History of esophageal reflux     History of pulmonary embolus (PE)     History of recurrent deep vein thrombosis (DVT)     HTN (hypertension)     IBS (irritable bowel syndrome)     OA (osteoarthritis) of hip     :   Past Surgical History:   Procedure Laterality Date    ANKLE FRACTURE SURGERY      APPENDECTOMY Bilateral 2009    CATARACT REMOVAL WITH IMPLANT Bilateral 12/21/2009    CORNEAL TRANSPLANT      12/21/2009    CYSTOCELE REPAIR  06/16/2009    HEMORRHOID SURGERY      RECTOCELE REPAIR  06/16/2009    SIGMOID COLECTOMY  06/16/2009    SMALL INTESTINE SURGERY  06/16/2009    JUANJOSE AND BSO (CERVIX REMOVED)  06/16/2009     Medications:  Scheduled Meds:   venlafaxine  37.5 mg Oral Daily    aspirin  81 mg Oral Daily    Or    aspirin  300 mg Rectal Daily    rivaroxaban  20 mg Oral Daily with breakfast    rosuvastatin  40 mg Oral Nightly     Continuous Infusions:   niCARdipine       PRN Meds:.ondansetron **OR** ondansetron, polyethylene glycol    Allergies   Allergen Reactions    Codeine Other (See Comments)    Vitamin K     Penicillin G Rash     Event:        Sulfa Antibiotics Rash     Event:       Social History     Socioeconomic History    Marital status: Unknown     Spouse name: Not on file    Number of children: Not on file    Years of education: Not on file    Highest education level: Not on file   Occupational History    Not on file   Tobacco Use    Smoking status: Never    Smokeless tobacco: Never   Vaping Use    Vaping Use: Never used   Substance and Sexual Activity    Alcohol use: Never    Drug use: Never    Sexual activity: Not on file   Other Topics Concern    Not on file   Social History Narrative    Not on file     Social Determinants of Health     Financial Resource Strain: Not on file   Food Insecurity: Not on file   Transportation Needs: Not on file   Physical Activity: Not on file   Stress: Not on file   Social Connections: Not on file   Intimate Partner Violence: Not on file   Housing Stability: Not on file      History reviewed. No pertinent family history.       ROS (10 systems)  In addition to that documented in the HPI above, the additional ROS was obtained:  Constitutional: Denies fevers or chills  Eyes: Denies vision changes  ENMT: Denies sore throat  CV: Denies chest pain  Resp: Denies SOB  GI: Denies vomiting or diarrhea  : Denies painful urination  MSK: Denies recent trauma  Skin: Denies new rashes  Neuro: \"Foggy\" thinking, headache, dizziness  Endocrine: Denies unexpected weight loss  Heme: Denies bleeding disorders    Physical Exam:      Wt Readings from Last 3 Encounters:   02/20/23 163 lb (73.9 kg)   10/27/22 167 lb (75.8 kg)   10/17/22 165 lb 9.6 oz (75.1 kg)     Temp Readings from Last 3 Encounters:   02/20/23 97.7 °F (36.5 °C) (Oral)   10/17/22 (!) 96.6 °F (35.9 °C) (Infrared)     BP Readings from Last 3 Encounters:   02/20/23 (!) 143/121   10/27/22 128/70   10/17/22 (!) 140/86     Pulse Readings from Last 3 Encounters:   02/20/23 83   10/27/22 72   10/17/22 89        Gen: A&O x 4, NAD, cooperative  HEENT: NC/AT, EOMI, PERRL, mmm, neck supple, no meningeal signs  Heart: NSR  Lungs: Respirations even and unlabored  Ext: no edema, no calf tenderness b/l  Psych: normal mood and affect, tearful at times   skin: no rashes or lesions    NEUROLOGIC EXAM:    Mental Status: A&O to self, location, month and year, NAD, speech clear, language fluent, repetition and naming intact, follows commands appropriately    Cranial Nerve Exam:   CN II-XII: PERRL, VFF, no nystagmus, no gaze paresis, sensation V1-V3 intact b/l, muscles of facial expression symmetric; hearing intact to conversational tone, palate elevates symmetrically, shoulder elevation symmetric and tongue protrudes midline with movement side to side.     Motor Exam:       Strength 5/5 UE's/LE's b/l  Tone and bulk normal   No pronator drift    Deep Tendon Reflexes: 1/4 biceps, triceps, brachioradialis, patellar, and achilles b/l; flexor plantar responses b/l    Sensation: Intact light touch/pinprick/vibration UE's/LE's b/l    Coordination/Cerebellum:       Tremors--none      Rapidly alternating movements: no dysdiadochokinesia b/l                Heel-to-Shin: no dysmetria b/l      Finger-to-Nose: no dysmetria b/l    Gait and stance:      Gait: deferred      LABS:     Recent Labs 02/20/23  0235   WBC 14.4*      K 4.0      CO2 21   BUN 22   CREATININE 0.7   GLUCOSE 111*           IMAGING:    CT head w/o contrast:  Impression   No acute intracranial abnormality. Age related changes including chronic small vessel ischemic disease and   cerebral atrophy. CTA head and neck:  Impression   1. No flow-limiting stenosis or occlusion within the neck. 2. 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 3. Focal high-grade stenosis of the right proximal P2 segment. 4. No evidence of large vessel occlusion within the intracranial circulation. RECOMMENDATIONS:   Unavailable     MRI brain w/wo contrast:  Pending    All imaging was personally reviewed   ASSESSMENT/PLAN:   40-year-old female presenting with several day history of headache, dizziness, mild confusion. Patient is alert and oriented x4 today. Vision is intact, face is symmetric. Speech is clear, language is fluent. Strength and sensation are intact in the upper and lower extremities. Patient is a very good historian and is able to provide information about the past several days as well as remote history and monitoring of hypertension. Patient does take Xarelto as prescribed without any missed doses. There were no focal or lateralizing deficits on exam.  Dizziness, headache, mild confusion likely secondary to HTN encephalopathy v acute stroke. Neuroimaging as above  CTA findings note pseudoaneurysm involving the left V2/V3 junction and focal high-grade stenosis in the proximal P2 segment  MRI brain pending  Continue rosuvastatin and Xarelto for secondary stroke prevention  From neurology standpoint patient does not need to be on aspirin 81 mg in addition to Xarelto  LDL pending, A1C pending  Recommend SBP <140  WBC 14.4 -?   UTI with UA pending  PT/OT as recommended  Further recommendations pending imaging results    Patient was discussed with attending neurologist Dr. Linden Escudero       Thank you for allowing us to participate in the care of your patient. If there are any questions regarding evaluation please feel free to contact us. Di Aschoff, APRN - CNP, 2/20/2023       ------------------------------------    Attending Note:  I have rounded on this patient with Alan Brewster CNP. I have reviewed the chart and we have discussed this case in detail. The patient was seen and examined by myself. Pertinent labs and imaging have been personally reviewed. Our findings and impressions were discussed with the patient. I concur with the Nurse Practioner's assessment and plan. Suspect symptoms are result of hypertensive urgency. Symptoms are improving. MRI of the brain is pending at this time. Discussed with the patient and her daughter. Further recommendations are as detailed above. We will continue to follow.     Ryan Whitlock DO 2/20/2023 9:25 PM

## 2023-02-20 NOTE — PROGRESS NOTES
Speech Language Pathology  Facility/Department: [address] [place] [address]   CLINICAL BEDSIDE SWALLOW EVALUATION    NAME: Anselmo Chin  : 1933  MRN: [id number]    IMPRESSIONS: Anselmo Chin was referred for bedside swallow evaluation after being admitted to [facility] with , blurry vision unsteady gait, and a headache. Head CT indicated No acute intracranial abnormality. MRI pending. Medical hx includes HTN, DVT, IBS, esophageal reflux. Pt seen for evaluation seated upright in bed, alert, cooperative, pleasant. Oral mechanism was [employer] with no asymmetry, pt was edentulous with dentures in place. Pt presented PO trials of thin via cup/straw, puree, and regular solid. Oral stage was [employer] characterized by intact labial seal, slow/prolonged mastication, adequate AP tranfer and oral clearance. Pharyngeal stage appears to be [employer] with adequate swallow initiation and laryngeal elevation. No s/s of aspiration noted across all trials. Speech/language/cognition screened and judged to be [employer]. Recommend easy to chew diet/thin liquids. No further acute SLP needs identified. Results/recommendations d/w pt. ADMISSION DATE: 2023  ADMITTING DIAGNOSIS: has Depression; B12 deficiency; Anticoagulated; Diverticulitis; Factor V Leiden mutation ([address] Personal history of other malignant neoplasm of skin; History of recurrent deep vein thrombosis (DVT);  History of pulmonary embolus (PE); History of esophageal reflux; and Hypertensive emergency on their problem list.  ONSET DATE: this admission    Recent Chest Xray/CT of Chest: see chart    Date of Eval: 2023  Evaluating Therapist: OLVIN Ruvalcaba    Current Diet level:  Current Diet : NPO      Primary Complaint       Pain:  Pain Assessment  Pain Assessment: None - Denies Pain    Reason for Referral  Anselmo Chin was referred for a bedside swallow evaluation to assess the efficiency of her swallow function, identify signs and symptoms of aspiration and make recommendations regarding safe dietary consistencies, effective compensatory strategies, and safe eating environment. Impression  Dysphagia Diagnosis: Swallow function appears WFL;No clinical indicators of dysphagia  Dysphagia Outcome Severity Scale: Level 6: Within functional limits/Modified independence     Treatment Plan  Requires SLP Intervention: No  Duration of Treatment: n/a          Recommended Diet and Intervention                   Compensatory Swallowing Strategies       Treatment/Goals       General  Chart Reviewed: Yes  Behavior/Cognition: Alert; Cooperative  Communication Observation: Functional  Follows Directions: Simple  Dentition: Edentulous  Patient Positioning: Upright in bed  Baseline Vocal Quality: Normal  Prior Dysphagia History: none  known prior to admission  Consistencies Administered: Regular;Pureed; Thin - cup; Thin - straw           Vision/Hearing       Oral Motor Deficits       Oral Phase Dysfunction  Oral Phase  Oral Phase: WFL     Indicators of Pharyngeal Phase Dysfunction   Pharyngeal Phase   Pharyngeal Phase: WFL    Prognosis       Education  Patient Education: results/recommendations  Patient Education Response: Verbalizes understanding             Therapy Time  SLP Individual Minutes  Time In: 1200  Time Out: 8129  Minutes: [address], [address]   2/20/2023 12:16 PM

## 2023-02-20 NOTE — PROGRESS NOTES
Pt has MRI order, I called EMI Dobbs to inform her of the MRI Q and it needing completed. RN said, \"I guess next shift can do it! \" And hung up the phone, order has been in since 10:30AM.

## 2023-02-20 NOTE — ED PROVIDER NOTES
The history is provided by the patient and the EMS personnel. Patient was transported to the emergency department because she has been having a 2-day history of dizziness where she feels very lightheaded and like she is going to be off balance. The patient has not fallen or struck her head since these episodes began. Patient is also been having a headache which she describes as a frontal headache which comes around to her temples. The patient has not been having any fevers, chills, nausea, vomiting or diarrhea. The patient only takes as her medications Effexor, Xarelto and some multivitamins. Review of Systems   Constitutional: Negative. HENT: Negative. Eyes: Negative. Respiratory: Negative. Cardiovascular: Negative. Gastrointestinal: Negative. Genitourinary: Negative. Musculoskeletal: Negative. Skin: Negative. Neurological: Negative. All other systems reviewed and are negative. History reviewed. No pertinent family history.   Social History     Socioeconomic History    Marital status: Unknown     Spouse name: Not on file    Number of children: Not on file    Years of education: Not on file    Highest education level: Not on file   Occupational History    Not on file   Tobacco Use    Smoking status: Never    Smokeless tobacco: Never   Vaping Use    Vaping Use: Never used   Substance and Sexual Activity    Alcohol use: Never    Drug use: Never    Sexual activity: Not on file   Other Topics Concern    Not on file   Social History Narrative    Not on file     Social Determinants of Health     Financial Resource Strain: Not on file   Food Insecurity: Not on file   Transportation Needs: Not on file   Physical Activity: Not on file   Stress: Not on file   Social Connections: Not on file   Intimate Partner Violence: Not on file   Housing Stability: Not on file     Past Surgical History:   Procedure Laterality Date    [address] Bilateral 06/16/2009    CATARACT REMOVAL WITH IMPLANT Bilateral 12/21/2009    CORNEAL TRANSPLANT      12/21/2009    CYSTOCELE REPAIR  06/16/2009    HEMORRHOID SURGERY      RECTOCELE REPAIR  06/16/2009    SIGMOID COLECTOMY  06/16/2009    SMALL INTESTINE SURGERY  06/16/2009    JUANJOSE AND BSO (CERVIX REMOVED)  06/16/2009     Past Medical History:   Diagnosis Date    Anticoagulated     B12 deficiency     Depression     Diverticulosis     History of esophageal reflux     History of pulmonary embolus (PE)     History of recurrent deep vein thrombosis (DVT)     HTN (hypertension)     IBS (irritable bowel syndrome)     OA (osteoarthritis) of hip      Allergies   Allergen Reactions    Codeine Other (See Comments)    Vitamin K     Penicillin G Rash     Event:        Sulfa Antibiotics Rash     Event:       Prior to Admission medications    Medication Sig Start Date End Date Taking? Authorizing Provider   Calcium Carbonate-Vitamin D (CALCIUM-VITAMIN D) 600-125 MG-UNIT TABS     Historical Provider, MD   XARELTO 20 MG TABS tablet TAKE 1 TABLET BY MOUTH EVERY DAY WITH FOOD FOR 30 DAYS  Patient not taking: Reported on 10/27/2022 9/13/22   Historical Provider, MD   Multiple Vitamin (MULTI-VITAMIN DAILY PO)     Historical Provider, MD   venlafaxine (EFFEXOR XR) 37.5 MG extended release capsule Take 1 capsule by mouth daily 10/17/22   Alpa Yepez MD   rivaroxaban (XARELTO) 20 MG TABS tablet Take 1 tablet by mouth daily (with breakfast) 10/17/22   Alpa Yepez MD   alendronate (FOSAMAX) 70 MG tablet 1 TABLET ORALLY ONCE A WEEK 90 DAYS 10/17/22   Alpa Yepez MD       BP (!) 168/103   Pulse 87   Temp 97.9 °F (36.6 °C)   Resp 16   Wt 163 lb (73.9 kg)   SpO2 100%   BMI 28.42 kg/m²     Physical Exam  Vitals and nursing note reviewed. Constitutional:       Appearance: She is well-developed. HENT:      Head: Normocephalic and atraumatic.       Right Ear: External ear normal.      Left Ear: External ear normal.      Nose: Nose normal. Eyes:      General: No visual field deficit. Conjunctiva/sclera: Conjunctivae normal.      Pupils: Pupils are equal, round, and reactive to light. Cardiovascular:      Rate and Rhythm: Normal rate and regular rhythm. Heart sounds: Normal heart sounds. Pulmonary:      Effort: Pulmonary effort is normal.      Breath sounds: Normal breath sounds. Abdominal:      General: Bowel sounds are normal.      Palpations: Abdomen is soft. Musculoskeletal:         General: Normal range of motion. Cervical back: Normal range of motion and neck supple. Right lower leg: Edema present. Left lower leg: Edema present. Skin:     General: Skin is warm and dry. Neurological:      General: No focal deficit present. Mental Status: She is alert. Mental status is at baseline. GCS: GCS eye subscore is 4. GCS verbal subscore is 5. GCS motor subscore is 6. Cranial Nerves: No cranial nerve deficit, dysarthria or facial asymmetry. Sensory: Sensation is intact. No sensory deficit. Motor: Motor function is intact. No weakness, tremor or pronator drift. Coordination: Coordination normal.       MDM:    Labs Reviewed   CBC WITH AUTO DIFFERENTIAL - Abnormal; Notable for the following components:       Result Value    WBC 14.4 (*)     RBC 5.49 (*)     Hemoglobin 16.4 (*)     Hematocrit 50.3 (*)     Bands Relative 1 (*)     Segs Relative 70.0 (*)     Lymphocytes % 17.0 (*)     Monocytes % 12.0 (*)     All other components within normal limits   BASIC METABOLIC PANEL - Abnormal; Notable for the following components:    Glucose 111 (*)     All other components within normal limits   TROPONIN       CC/HPI Summary, DDx, ED Course, and Reassessment: Saline lock was established on the patient. The patient's presentation blood pressure was 229/117. This was compared to adjacent arms as well as multiple checks and was verified significantly elevated.   Patient was symptomatic complaining of headache as well as dizziness with the elevated blood pressure. I elected that this could represent a hypertensive emergency and went ahead and gave the patient hydralazine 10 mg IV 1 dose. Repeat blood pressures revealed that the patient has a 169/97 currently with a heart rate of 90. The patient does not have any associated dizziness now that her blood pressure has been decreased but she still has some residual headache. Evaluation of the patient in the emergency department reveals that she had a normal EKG with normal troponin and kidney function. Patient's white blood cell count was minimally elevated which is nonspecific finding she does not have any evidence of infection and she is afebrile in the emergency department this could represent an acute phase reaction secondary to her elevated blood pressure. The patient CT scan does not show any evidence of acute pathology there is some age-related degenerative changes but is otherwise unremarkable. In addition the patient's chest x-ray is also negative. The patient will need admission to the hospital and I am going to contact the hospitalist for admission. The patient is going to require long-term hypertensive management as she does not take any blood pressure medicines currently. The patient does take a blood thinner secondary to her diagnosis of factor V Leiden deficiency. It should be noted that the patient is a longtime user of Effexor. And she has had a recent change in her medication. The patient used to take Effexor 3 times a day but has since had that changed where she is only taking a once a day dose. This is all been occurring within the last month or so. Patient does not have any James criteria for serotonin syndrome in the emergency department she only had the headache as well as the lightheadedness did not observe any type of nystagmus tremors or hypertonia she has not had any seizures.   No evidence of the serotonin syndrome or serious reaction. History from : Patient    Limitations to history : None    Patient was given the following medications:  Medications   hydrALAZINE (APRESOLINE) injection 10 mg (10 mg IntraVENous Given 2/20/23 0494)   acetaminophen (TYLENOL) tablet 1,000 mg (1,000 mg Oral Given 2/20/23 3566)       Independent Imaging Interpretation by Radiology  CT HEAD WO CONTRAST   Final Result   No acute intracranial abnormality. Age related changes including chronic small vessel ischemic disease and   cerebral atrophy. XR CHEST PORTABLE   Final Result   No acute cardiopulmonary disease. EKG (if obtained): (All EKG's are interpreted by myself in the absence of a cardiologist) normal sinus rhythm 84 bpm.  Evidence of left atrial enlargement. There are nonspecific T wave and ST segment changes but these do not appear to be acute in nature    Chronic conditions affecting care: Factor V Leiden deficiency        Social Determinants : None    Records Reviewed : None    Disposition   Discussion with inpatient hospitalist concerning patient's care and the patient will be transferred to [facility] for further management    I am the Primary Clinician of Record. Critical care time of 30 minutes was given to this patient which included time at the bedside, discussions with consultants, review of the chart, and lab studies. This critical care time does not include any billable procedures. Final Impression    1. Hypertensive emergency, no CHF    2.  [address] [facility],   02/20/23 6720

## 2023-02-21 ENCOUNTER — APPOINTMENT (OUTPATIENT)
Dept: MRI IMAGING | Age: 88
DRG: 065 | End: 2023-02-21
Payer: MEDICARE

## 2023-02-21 LAB
ANION GAP SERPL CALCULATED.3IONS-SCNC: 12 MMOL/L (ref 4–16)
BASOPHILS ABSOLUTE: 0 K/CU MM
BASOPHILS RELATIVE PERCENT: 0.2 % (ref 0–1)
BUN SERPL-MCNC: 27 MG/DL (ref 6–23)
CALCIUM SERPL-MCNC: 9.1 MG/DL (ref 8.3–10.6)
CHLORIDE BLD-SCNC: 103 MMOL/L (ref 99–110)
CHOLEST SERPL-MCNC: 146 MG/DL
CO2: 21 MMOL/L (ref 21–32)
CREAT SERPL-MCNC: 0.8 MG/DL (ref 0.6–1.1)
DIFFERENTIAL TYPE: ABNORMAL
EOSINOPHILS ABSOLUTE: 0 K/CU MM
EOSINOPHILS RELATIVE PERCENT: 0.3 % (ref 0–3)
ESTIMATED AVERAGE GLUCOSE: 123 MG/DL
GFR SERPL CREATININE-BSD FRML MDRD: >60 ML/MIN/1.73M2
GLUCOSE SERPL-MCNC: 103 MG/DL (ref 70–99)
HBA1C MFR BLD: 5.9 % (ref 4.2–6.3)
HCT VFR BLD CALC: 46 % (ref 37–47)
HDLC SERPL-MCNC: 62 MG/DL
HEMOGLOBIN: 15.1 GM/DL (ref 12.5–16)
IMMATURE NEUTROPHIL %: 2 % (ref 0–0.43)
LDLC SERPL CALC-MCNC: 65 MG/DL
LYMPHOCYTES ABSOLUTE: 2 K/CU MM
LYMPHOCYTES RELATIVE PERCENT: 14 % (ref 24–44)
MCH RBC QN AUTO: 29.9 PG (ref 27–31)
MCHC RBC AUTO-ENTMCNC: 32.8 % (ref 32–36)
MCV RBC AUTO: 91.1 FL (ref 78–100)
MONOCYTES ABSOLUTE: 1 K/CU MM
MONOCYTES RELATIVE PERCENT: 7.1 % (ref 0–4)
NUCLEATED RBC %: 0 %
PDW BLD-RTO: 14.5 % (ref 11.7–14.9)
PLATELET # BLD: 324 K/CU MM (ref 140–440)
PMV BLD AUTO: 9.3 FL (ref 7.5–11.1)
POTASSIUM SERPL-SCNC: 4.5 MMOL/L (ref 3.5–5.1)
RBC # BLD: 5.05 M/CU MM (ref 4.2–5.4)
SEGMENTED NEUTROPHILS ABSOLUTE COUNT: 11 K/CU MM
SEGMENTED NEUTROPHILS RELATIVE PERCENT: 76.4 % (ref 36–66)
SODIUM BLD-SCNC: 136 MMOL/L (ref 135–145)
TOTAL IMMATURE NEUTOROPHIL: 0.28 K/CU MM
TOTAL NUCLEATED RBC: 0 K/CU MM
TRIGL SERPL-MCNC: 97 MG/DL
WBC # BLD: 14.4 K/CU MM (ref 4–10.5)

## 2023-02-21 PROCEDURE — 97530 THERAPEUTIC ACTIVITIES: CPT

## 2023-02-21 PROCEDURE — 97162 PT EVAL MOD COMPLEX 30 MIN: CPT

## 2023-02-21 PROCEDURE — 94761 N-INVAS EAR/PLS OXIMETRY MLT: CPT

## 2023-02-21 PROCEDURE — 83036 HEMOGLOBIN GLYCOSYLATED A1C: CPT

## 2023-02-21 PROCEDURE — 80048 BASIC METABOLIC PNL TOTAL CA: CPT

## 2023-02-21 PROCEDURE — 97165 OT EVAL LOW COMPLEX 30 MIN: CPT

## 2023-02-21 PROCEDURE — 6370000000 HC RX 637 (ALT 250 FOR IP): Performed by: STUDENT IN AN ORGANIZED HEALTH CARE EDUCATION/TRAINING PROGRAM

## 2023-02-21 PROCEDURE — 2140000000 HC CCU INTERMEDIATE R&B

## 2023-02-21 PROCEDURE — 80061 LIPID PANEL: CPT

## 2023-02-21 PROCEDURE — 97116 GAIT TRAINING THERAPY: CPT

## 2023-02-21 PROCEDURE — 6370000000 HC RX 637 (ALT 250 FOR IP)

## 2023-02-21 PROCEDURE — 85025 COMPLETE CBC W/AUTO DIFF WBC: CPT

## 2023-02-21 PROCEDURE — 36415 COLL VENOUS BLD VENIPUNCTURE: CPT

## 2023-02-21 RX ORDER — LANOLIN ALCOHOL/MO/W.PET/CERES
3 CREAM (GRAM) TOPICAL NIGHTLY PRN
Status: COMPLETED | OUTPATIENT
Start: 2023-02-21 | End: 2023-02-21

## 2023-02-21 RX ADMIN — ASPIRIN 81 MG: 81 TABLET, COATED ORAL at 08:54

## 2023-02-21 RX ADMIN — VENLAFAXINE HYDROCHLORIDE 37.5 MG: 37.5 CAPSULE, EXTENDED RELEASE ORAL at 08:54

## 2023-02-21 RX ADMIN — ROSUVASTATIN CALCIUM 40 MG: 20 TABLET, COATED ORAL at 20:33

## 2023-02-21 RX ADMIN — RIVAROXABAN 20 MG: 20 TABLET, FILM COATED ORAL at 08:54

## 2023-02-21 RX ADMIN — Medication 3 MG: at 20:33

## 2023-02-21 NOTE — PROGRESS NOTES
Physical Therapy  Formerly [facility] ACUTE CARE PHYSICAL THERAPY EVALUATION  [address] 89, 6/7/1933, 3125/3125-A, 2/21/2023    History  [place]:  The primary encounter diagnosis was Hypertensive emergency, no CHF. A diagnosis of Lightheadedness was also pertinent to this visit. Patient  has a past medical history of Anticoagulated, B12 deficiency, Depression, Diverticulosis, History of esophageal reflux, History of pulmonary embolus (PE), History of recurrent deep vein thrombosis (DVT), HTN (hypertension), IBS (irritable bowel syndrome), and OA (osteoarthritis) of hip. Patient  has a past surgical history that includes Total abdominal hysterectomy w/ bilateral salpingoophorectomy (06/16/2009); Cataract removal with implant (Bilateral, 12/21/2009); Corneal transplant; Hemorrhoid surgery; Ankle fracture surgery (1976); Cystocele repair (06/16/2009); Rectocele repair (06/16/2009); Appendectomy (Bilateral, 06/16/2009); Small intestine surgery (06/16/2009); and Sigmoid Colectomy (06/16/2009). Assessment:  Pt presents 1 day s/p admission for lightheadedness, headache, hypertensive emergency ; no lateralizing of s/s, no visual changes, no sensation changes, no issues w/ coordination. Pt is from [facility], uses walker at baseline, typically ind w/ all performance w/ supportive assist from staff. Pt is primarily limited by strength and endurance, additional deficits include impaired functional mobility, posture. Functioning well, slight pt reported weakness not impacting mobility, good safety awareness. Recommending home w/ assist + HH PT PRN ; eval DC. Complexity: Moderate  Prognosis: Good, weakness  Plan eval DC   Equipment: pend to next LOC    Recommendations for NURSING mobility: amb to/from BR + halls    Subjective:  Patient states:  \"I'm feeling better than yesterday\"    Pain:  denies.     Communication with other providers:  Handoff to RN  Restrictions: fall risk; general precautions; systolic under [id number] Emily Toro level of function  Social/Functional History  Lives With: Other (comment) ([facility])  Type of Home: Apartment  Home Layout: One level  Home Access: Level entry  Bathroom Toilet: Handicap height  Bathroom Equipment: Grab bars in shower, Grab bars around toilet  Home Equipment: Walker, rolling, Grab bars  Has the patient had two or more falls in the past year or any fall with injury in the past year?: No  ADL Assistance: [address] [place] [address] Avenue: Independent  Homemaking Responsibilities: Yes (assist from [address] staff ; still does as much as possible)  Ambulation Assistance: Independent (using FWW)  Transfer Assistance: Independent  Active : No  Occupation: Retired  Type of Occupation:  to     Examination of body systems (includes body structures/functions, activity/participation limitations):  Observation:  Supine, awake, alert, agreeable. She is pleasant and participatory t/o ; good safety awareness. Normotensive t/o OOB. Tele, BP cuff in place upon arrival  Posture: fair ; slight kyphosis, fwd head/shoulders  Vision:  glasses  Hearing:  WFL  Cardiopulmonary:  WFL  Cognition: A&O x 4 ; alert, follows commands w/o difficulty, attention intact, memory appears intact, good safety awareness, min cues needed for sequencing/setup, no cues needed for initiation, good insight into deficits    Musculoskeletal  ROM R/L: AROM WFL. Strength R/L:  grossly 4+/5  Sensation: WFL  Tone: WFL  Coordination: WFL  Proprioception: WFL    Mobility:  Supine to sit:  SBA  Transfers: CGA  Sitting balance:  good. Standing balance:  fair +.     Gait: 200 ft using FWW at SBA ; dec becky, slight fwd flexed posture and fwd AD mgmt attending w/ min cues, intermittent standing breaks, dec BLE clearance, inc time on task for performance, reciprocal/steady/safe qualities    Goals:  Pt Goals: return to PLOF  Short Term Goals  Time Frame for Short Term Goals: eval DC          How much help is needed turning from your back to your side while in a flat bed without using bedrails? A Little   How much help is needed moving from lying on your back to sitting on the side of a flat bed without using bedrails? A Little   How much help is needed moving to and from a bed to a chair? A Little   How much help is needed standing up from a chair using your arms? None   How much help is needed walking in hospital room? None   How much help is needed climbing 3-5 steps with a railing? A Little   AM-Located within [facility] Inpatient Mobility Raw Score  20   AM-Located within [facility] Inpatient T-Scale Score  47.67   Mobility Inpatient CMS 0-100% Score 35.83   Mobility Inpatient CMS G-Code Modifier  CJ       Treatment plan:  Bed mobility, functional mobility, transfers, balance, gait, TA, TX, strength activities, neuro    Treatment:  Gait Training:  Cues were given for safety, sequence, device management, balance, posture, awareness, path.       Positioned for comfort and pressure relief  Safety: patient left in chair, call light within reach, RN notified, gait belt used, all needs met    Time:   Time in: 0904  Time out: 0932  Timed treatment minutes: 18  Total time + eval: 28    Electronically signed by:    Torres Amin PT, DPT  2/21/2023, 11:55 AM

## 2023-02-21 NOTE — PLAN OF CARE
Problem: Discharge Planning  Goal: Discharge to home or other facility with appropriate resources  2/21/2023 0146 by Wandy Coulter RN  Outcome: Progressing  Flowsheets (Taken 2/20/2023 2000)  Discharge to home or other facility with appropriate resources: Identify barriers to discharge with patient and caregiver  2/20/2023 1453 by Jamshid Manrique RN  Outcome: Progressing     Problem: Safety - Adult  Goal: Free from fall injury  2/21/2023 0146 by Wandy Coulter RN  Outcome: Progressing  2/20/2023 1453 by Jamshid Manrique RN  Outcome: Progressing     Problem: ABCDS Injury Assessment  Goal: Absence of physical injury  2/21/2023 0146 by Wandy Coulter RN  Outcome: Progressing  2/20/2023 1453 by Jamshid Manrique RN  Outcome: Progressing

## 2023-02-21 NOTE — PROGRESS NOTES
V2.0  [facility] Hospitalist Progress Note      Name:  Evan Sanchez /Age/Sex: 1933  (80 y.o. female)   MRN & CSN:  [id number] & [id number] Encounter Date/Time: 2023 1:49 PM EST    [facility]:  [facility] PCP: Pete Zepeda, [address] Prieto Day: 2    Assessment and Plan:   Evan Sanchez is a 80 y.o. female with possible stroke      Plan: patient needs MRI, then ok for discharge    Hypertensive emergency  Patient with a presenting blood pressure in the systolics of 266J. Does have associated blurry vision, dizziness, and unsteady gait. Not on any blood pressure medications at baseline  Cardene drip titrate to blood pressure of 180  We will allow her to remain hypertensive and neurology for at least the next 24 hours  Avoid dropping her blood pressure as this could result in worsening of a stroke if there is a clot. Dizziness with unsteady gait, R/O CVA  Differential includes stroke or due to hypertensive emergency. The order of operations areas with support and as a clot could cause her to be hypertensive. CT head was okay  Neurology consulted  CTA was ordered stat to visualize vasculature and see if there is a clot. MRI brain ordered  Start patient on aspirin statin  Patient having a blood pressure of 180 being targeted for hypertensive emergency. Still want her to be unregulated device because of bleed nor too low and resulted in a larger stroke. 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 Urinary tract infection  Patient with increased urinary frequency without burning. Patient does have an elevated white count of 14. But denies fever or chills. We will check a UA and if positive initiate treatment with IV antibiotics. Ppx: asa, Xarelto  Dispo: inpatient for MRI    Subjective:     Chief Complaint: Dizziness (X 2 days)       Patient is without complaints. No adverse interval events.            Review of Systems:    Review of Systems    10 point ROS negative except as stated above in \"subjective\" section    Objective: Intake/Output Summary (Last 24 hours) at 2/21/2023 1349  Last data filed at 2/20/2023 1404  Gross per 24 hour   Intake 240 ml   Output --   Net 240 ml        Vitals:   Vitals:    02/21/23 1203   BP: 130/80   Pulse:    Resp:    Temp: (!) 95.8 °F (35.4 °C)   SpO2: 96%       Physical Exam:     General: NAD  Eyes: EOMI  ENT: neck supple  Cardiovascular: Regular rate. Respiratory: Clear to auscultation  Gastrointestinal: Soft, non tender  Genitourinary: no suprapubic tenderness  Musculoskeletal: No edema  Skin: warm, dry  Neuro: Alert. Psych: Mood appropriate.      Medications:   Medications:    venlafaxine  37.5 mg Oral Daily    aspirin  81 mg Oral Daily    Or    aspirin  300 mg Rectal Daily    rivaroxaban  20 mg Oral Daily with breakfast    rosuvastatin  40 mg Oral Nightly      Infusions:    niCARdipine       PRN Meds: ondansetron, 4 mg, Q8H PRN   Or  ondansetron, 4 mg, Q6H PRN  polyethylene glycol, 17 g, Daily PRN        Labs      Recent Results (from the past 24 hour(s))   Troponin    Collection Time: 02/20/23  2:33 PM   Result Value Ref Range    Troponin T <0.010 <0.01 NG/ML   Lipid Panel    Collection Time: 02/21/23  5:21 AM   Result Value Ref Range    Triglycerides 97 <150 MG/DL    Cholesterol 146 <200 MG/DL    HDL 62 >40 MG/DL    LDL Calculated 65 <100 MG/DL   Basic Metabolic Panel w/ Reflex to MG    Collection Time: 02/21/23  5:21 AM   Result Value Ref Range    Sodium 136 135 - 145 MMOL/L    Potassium 4.5 3.5 - 5.1 MMOL/L    Chloride 103 99 - 110 mMol/L    CO2 21 21 - 32 MMOL/L    Anion Gap 12 4 - 16    BUN 27 (H) 6 - 23 MG/DL    Creatinine 0.8 0.6 - 1.1 MG/DL    Est, Glom Filt Rate >60 >60 mL/min/1.73m2    Glucose 103 (H) 70 - 99 MG/DL    Calcium 9.1 8.3 - 10.6 MG/DL   CBC with Auto Differential    Collection Time: 02/21/23  5:21 AM   Result Value Ref Range    WBC 14.4 (H) 4.0 - 10.5 K/CU MM    RBC 5.05 4.2 - 5.4 M/CU MM    Hemoglobin 15.1 12.5 - 16.0 GM/DL    Hematocrit 46.0 37 - 47 %    MCV 91.1 78 - 100 FL    MCH 29.9 27 - 31 PG    MCHC 32.8 32.0 - 36.0 %    RDW 14.5 11.7 - 14.9 %    Platelets 694 829 - 934 K/CU MM    MPV 9.3 7.5 - 11.1 FL    Differential Type AUTOMATED DIFFERENTIAL     Segs Relative 76.4 (H) 36 - 66 %    Lymphocytes % 14.0 (L) 24 - 44 %    Monocytes % 7.1 (H) 0 - 4 %    Eosinophils % 0.3 0 - 3 %    Basophils % 0.2 0 - 1 %    Segs Absolute 11.0 K/CU MM    Lymphocytes Absolute 2.0 K/CU MM    Monocytes Absolute 1.0 K/CU MM    Eosinophils Absolute 0.0 K/CU MM    Basophils Absolute 0.0 K/CU MM    Nucleated RBC % 0.0 %    Total Nucleated RBC 0.0 K/CU MM    Total Immature Neutrophil 0.28 K/CU MM    Immature Neutrophil % 2.0 (H) 0 - 0.43 %   Hemoglobin A1c    Collection Time: 02/21/23  5:45 AM   Result Value Ref Range    Hemoglobin A1C 5.9 4.2 - 6.3 %    eAG 123 mg/dL        Imaging/Diagnostics Last 24 Hours   CT HEAD WO CONTRAST    Result Date: 2/20/2023  EXAMINATION: CT OF THE HEAD WITHOUT CONTRAST  2/20/2023 3:03 am TECHNIQUE: CT of the head was performed without the administration of intravenous contrast. Automated exposure control, iterative reconstruction, and/or weight based adjustment of the mA/kV was utilized to reduce the radiation dose to as low as reasonably achievable. COMPARISON: None. HISTORY: ORDERING SYSTEM PROVIDED HISTORY: head pain TECHNOLOGIST PROVIDED HISTORY: Has a \"code stroke\" or \"stroke alert\" been called? ->No Reason for exam:->head pain Reason for Exam: Head pain, High BP FINDINGS: BRAIN/VENTRICLES: There is no acute intracranial hemorrhage, mass effect or midline shift. No abnormal extra-axial fluid collection. The gray-white differentiation is maintained without evidence of an acute infarct. There is no evidence of hydrocephalus. Moderate periventricular and deep subcortical white matter hypodensity is present. Diffuse atrophy is noted.  ORBITS: The visualized portion of the orbits demonstrate no acute abnormality. SINUSES: The visualized paranasal sinuses and mastoid air cells demonstrate no acute abnormality. SOFT TISSUES/SKULL:  No acute abnormality of the visualized skull or soft tissues. No acute intracranial abnormality. Age related changes including chronic small vessel ischemic disease and cerebral atrophy. XR CHEST PORTABLE    Result Date: 2/20/2023  EXAMINATION: ONE XRAY VIEW OF THE CHEST 2/20/2023 3:01 am COMPARISON: None. HISTORY: ORDERING SYSTEM PROVIDED HISTORY: chest pain TECHNOLOGIST PROVIDED HISTORY: Reason for exam:->chest pain Reason for Exam: chest pain FINDINGS: No lines or tubes. Normal cardiomediastinal silhouette. The lungs are clear without focal consolidation or pleural effusion. No suspicious pulmonary nodules. No pulmonary edema. No pneumothorax. No acute osseous abnormality. No acute cardiopulmonary disease. CTA head neck with contrast    Result Date: 2/20/2023  EXAMINATION: CTA OF THE HEAD AND NECK WITH CONTRAST 2/20/2023 11:31 am: TECHNIQUE: CTA of the head and neck was performed with the administration of intravenous contrast. Multiplanar reformatted images are provided for review. MIP images are provided for review. Stenosis of the internal carotid arteries measured using NASCET criteria. Automated exposure control, iterative reconstruction, and/or weight based adjustment of the mA/kV was utilized to reduce the radiation dose to as low as reasonably achievable. COMPARISON: None. HISTORY: ORDERING SYSTEM PROVIDED HISTORY: R/O occlusion FINDINGS: CTA NECK: AORTIC ARCH/ARCH VESSELS: No dissection or arterial injury. No significant stenosis of the brachiocephalic or subclavian arteries. CAROTID ARTERIES: No dissection, arterial injury, or hemodynamically significant stenosis by NASCET criteria. Retropharyngeal course of the bilateral carotid arteries.  VERTEBRAL ARTERIES: No hemodynamically significant stenosis or occlusion within the cervical vertebral arteries. 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 SOFT TISSUES: The lung apices are clear. No cervical or superior mediastinal lymphadenopathy. The larynx and pharynx are unremarkable. No acute abnormality of the salivary and thyroid glands. BONES: No acute osseous abnormality. CTA HEAD: ANTERIOR CIRCULATION: No significant stenosis of the intracranial internal carotid, anterior cerebral, or middle cerebral arteries. No aneurysm. POSTERIOR CIRCULATION: There is fetal origin left PCA. There is focal high-grade stenosis of the right proximal P2 segment. The basilar artery and intracranial vertebral arteries are patent without high-grade stenosis or occlusion. No aneurysm. OTHER: No dural venous sinus thrombosis on this non-dedicated study. BRAIN: No mass effect or midline shift. No extra-axial fluid collection. The gray-white differentiation is maintained. 1. No flow-limiting stenosis or occlusion within the neck. 2. 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 3. Focal high-grade stenosis of the right proximal P2 segment. 4. No evidence of large vessel occlusion within the intracranial circulation.  RECOMMENDATIONS: Unavailable       Electronically signed by Maegan Mcdermott MD on 2/21/2023 at 1:49 PM

## 2023-02-21 NOTE — PROGRESS NOTES
Physician Progress Note      PATIENT:               Katerine Lezama  CSN #:                  [id number]  :                       1933  ADMIT DATE:       2023 2:16 AM  DISCH DATE:  RESPONDING  PROVIDER #:        Maegan Mcdermott MD          QUERY TEXT:    Hospitalists,    Pt admitted with HTN urgency. Noted documentation of confusion due to HTN   encephalopathy by Neurology. If possible, please document in progress notes   and discharge summary:      The medical record reflects the following:  Risk Factors: HTN, possible UTI  Clinical Indicators: confusion d/t HTN encephalopathy documented by Neuro, H&P   documents no confusion  Treatment: labs, imaging, Neuro consult, observation, BP control    Thank you,  oYmaira Packer RN CDS  [phone]  Options provided:  -- HTN encephalopathy confirmed present on admission  -- HTN encephalopathy confirmed not present on admission  -- HTN encephalopathy ruled out  -- Other - I will add my own diagnosis  -- Disagree - Not applicable / Not valid  -- Disagree - Clinically unable to determine / Unknown  -- Refer to Clinical Documentation Reviewer    PROVIDER RESPONSE TEXT:    The diagnosis of HTN encephalopathy was confirmed as present on admission.     Query created by: Viviana Llanes on 2023 11:47 AM      Electronically signed by:  Maegan Mcdermott MD 2023 11:51 AM

## 2023-02-21 NOTE — PROGRESS NOTES
Occupational Therapy    [facility] ACUTE CARE OCCUPATIONAL THERAPY EVALUATION  [address], 6/7/1933, 3125/3125-A, 2/21/2023    History  [place]:  The primary encounter diagnosis was Hypertensive emergency, no CHF. A diagnosis of Lightheadedness was also pertinent to this visit. Patient  has a past medical history of Anticoagulated, B12 deficiency, Depression, Diverticulosis, History of esophageal reflux, History of pulmonary embolus (PE), History of recurrent deep vein thrombosis (DVT), HTN (hypertension), IBS (irritable bowel syndrome), and OA (osteoarthritis) of hip. Patient  has a past surgical history that includes Total abdominal hysterectomy w/ bilateral salpingoophorectomy (06/16/2009); Cataract removal with implant (Bilateral, 12/21/2009); Corneal transplant; Hemorrhoid surgery; Ankle fracture surgery (1976); Cystocele repair (06/16/2009); Rectocele repair (06/16/2009); Appendectomy (Bilateral, 06/16/2009); Small intestine surgery (06/16/2009); and Sigmoid Colectomy (06/16/2009). Subjective:  Patient states:  \"I walked with the PT earlier\".     Pain:  No.    Communication with other providers:  Handoff to RN  Restrictions: General Precautions, Fall Risk    Home Setup/Prior level of function  Social/Functional History  Lives With: Other (comment) ([facility])  Type of Home: Apartment  Home Layout: One level  Home Access: Level entry  Bathroom Toilet: Handicap height  Bathroom Equipment: Grab bars in shower, Grab bars around toilet  Home Equipment: Walker, rolling, Grab bars  Has the patient had two or more falls in the past year or any fall with injury in the past year?: No  ADL Assistance: [facility] [address]: Independent  Homemaking Responsibilities: Yes ([facility] ; still does as much as possible)  Ambulation Assistance: Independent (using FWW)  Transfer Assistance: Independent  Active : No  Occupation: Retired  Type of Occupation:  to     Examination of body systems (includes body structures/functions, activity/participation limitations):  Observation:  Sitting upright in chair, agreeable to therapy   Vision:  Glasses  Hearing:  "[employer] [facility] [place]  Cardiopulmonary:  No 02 needs      Body Systems and functions:  ROM R/L:  WFL. Strength R/L:  4+/5,   Sensation: WFL  Tone: Normal  Coordination: WFL  Perception: WNL    Activities of Daily Living (ADLs):  Feeding: Magda  Grooming: SBA (washing hands/face w/ warm washcloth)  UB bathing: Independent  LB bathing: SBA  UB dressing: Independent  LB dressing: SBA  Toileting: SBA    Cognitive and Psychosocial Functioning:  Overall cognitive status: WNL  Affect: Normal        Mobility:  Supine to sit:  DNT pt received sitting upright in chair  Transfers: SBA from reclining chair up to RW  Sitting balance:  Independent. Standing balance:  SBA w/ RW. Functional Mobility SBA w/ RW ~100 feet  Toilet/Shower Transfers: DNT             -[facility] Daily Activity - Inpatient   How much help is needed for putting on and taking off regular lower body clothing?: A Little  How much help is needed for bathing (which includes washing, rinsing, drying)?: A Little  How much help is needed for toileting (which includes using toilet, bedpan, or urinal)?: A Little  How much help is needed for putting on and taking off regular upper body clothing?: None  How much help is needed for taking care of personal grooming?: A Little  How much help for eating meals?: None  [facility] Inpatient Daily Activity Raw Score: 20  AM-PAC Inpatient ADL T-Scale Score : 42.03  ADL Inpatient CMS 0-100% Score: 38.32  ADL Inpatient CMS G-Code Modifier : CJ    Treatment:  Therapeutic Activity Training:   Therapeutic activity training was instructed today. Cues were given for safety, sequence, UE/LE placement, awareness, and balance.     Activities performed today included STS, functional mobility, stand to sit    Safety: patient left in chair with chair alarm, call light within reach, RN notified, gait belt used. Assessment:  Pt is a 81 yo female admitted from home for hypertensive emergency. Pt at baseline is Independent for ADLs Independent for high level IADLs and Independent for functional transfers/mobility. Pt currently presents w/ mindeficits in ADL and high level IADL independence, functional activity tolerance, dynamic sitting and standing balance and tolerance and functional transfers and BUE strength. Pt would benefit from continued acute care OT services w/ discharge to home w/ home health OT S1 w/ assist PRN  Complexity: Low  Prognosis: Good, no significant barriers to participation at this time. Occupational Therapy Plan  Times Per Week: 1x+  Times Per Day:  Once a day  Current Treatment Recommendations: Strengthening, Balance training, Functional mobility training, Endurance training, Patient/Caregiver education & training, Self-Care / ADL, Safety education & training, Pain management, Equipment evaluation, education, & procurement, Positioning, Home management training     Equipment: defer    Goals:  Pt goal: go home  Time Frame for STGs: discharge  Goal 1: Pt will perform UE ADLs Independent  Goal 2: Pt will perform LE ADLs Independent  Goal 3: Pt will perform toileting Independent  Goal 4: Pt will perform functional transfer w/ AD Magda  Goal 5: Pt will perform functional mobility w/ AD Magda  Goal 6: Pt will perform therex/theract in order to increase functional activity tolerance and dynamic standing balance    Treatment plan:  Pt will perform functional task in stand reaching in all 3 planes in order to increase dynamic standing balance and functional activity tolerance    Recommendations for NURSING activity: Up to chair for all 3 meals and up to standard commode for all toileting needs    Time:   Time in: 1000  Time out: 1014  Timed treatment minutes: 9 minutes  Total time: 14 minutes    Electronically signed by: Sujatha Chandler OTMICHELE/L [id number]  10:28 AM,2/21/2023

## 2023-02-21 NOTE — CARE COORDINATION
Case Management Assessment  Initial Evaluation    Date/Time of Evaluation: 2/21/2023 5:09 PM  Assessment Completed by: JOANNE Pineda    If patient is discharged prior to next notation, then this note serves as note for discharge by case management. Patient Name: Steven Hartley                   YOB: 1933  Diagnosis: Lightheadedness [R42]  Hypertensive emergency, no CHF [I16.1]  Hypertensive emergency [I16.1]                   Date / Time: 2/20/2023  2:16 AM    Patient Admission Status: Inpatient   Readmission Risk (Low < 19, Mod (19-27), High > 27): Readmission Risk Score: 8.2    Current PCP: Marija Keating MD  PCP verified by CM? Yes    Chart Reviewed: Yes      History Provided by: Patient  Patient Orientation: Alert and Oriented    Patient Cognition:      Hospitalization in the last 30 days (Readmission):  No    If yes, Readmission Assessment in  Navigator will be completed. Advance Directives:      Code Status: Full Code   Patient's Primary Decision Maker is: Named in [address] [place] [address]    Primary Decision MakerBoswaldojanny Garcia - Child - [phone]    Discharge Planning:    Patient lives with: Other (Comment) [facility] Home: [facility]  Primary Care Giver: Other (Comment) (assisted living)  Patient Support Systems include: Other (Comment) (assisted living)   Current Financial resources: Medicare  Current community resources: ECF/Home Care  Current services prior to admission: [facility]            Current DME:              Type of Home Care services:  None    ADLS  Prior functional level: Assistance with the following:, Mobility  Current functional level: Assistance with the following:, Mobility    PT AM-PAC: 20 /24  OT AM-PAC: 20 /24    Family can provide assistance at DC: Yes  Would you like Case Management to discuss the discharge plan with any other family members/significant others, and if so, who?  No  Plans to Return to Present Housing: Yes  Other Identified Issues/Barriers to RETURNING to current housing: MRI  Potential Assistance needed at discharge: N/A            Potential DME:  none  Patient expects to discharge to: [address] for transportation at discharge:  tbd    Financial    Payor: Echo Rick / Plan: Ana Herrera PPO / Product Type: Medicare /     Does insurance require precert for SNF: Yes    Potential assistance Purchasing Medications: No  Meds-to-Beds request: No      [id number] [phone] Ester Ricardo [phone]  [phone]  Phone: [phone] Fax: [phone]      Notes:    Factors facilitating achievement of predicted outcomes: Family support, Motivated, Cooperative, and Pleasant    Barriers to discharge: MRI    Additional Case Management Notes: CM in to see Pt to initiate discharge planning. Pt from [facility] and plans to return. Pt denies any needs at this time. CM following     The Plan for Transition of Care is related to the following treatment goals of Lightheadedness [R42]  Hypertensive emergency, no CHF [I16.1]  Hypertensive emergency [I16.1]    The Patient and/or Patient Representative Agree with the Discharge Plan?   Yes     Zachery Mohan [facility]  Case Management Department  Ph: [phone]

## 2023-02-22 ENCOUNTER — TELEPHONE (OUTPATIENT)
Dept: FAMILY MEDICINE CLINIC | Age: 88
End: 2023-02-22

## 2023-02-22 ENCOUNTER — APPOINTMENT (OUTPATIENT)
Dept: MRI IMAGING | Age: 88
DRG: 065 | End: 2023-02-22
Payer: MEDICARE

## 2023-02-22 VITALS
HEIGHT: 63 IN | TEMPERATURE: 97.7 F | SYSTOLIC BLOOD PRESSURE: 134 MMHG | DIASTOLIC BLOOD PRESSURE: 70 MMHG | WEIGHT: 156.9 LBS | OXYGEN SATURATION: 96 % | BODY MASS INDEX: 27.8 KG/M2 | HEART RATE: 108 BPM | RESPIRATION RATE: 17 BRPM

## 2023-02-22 LAB
ANION GAP SERPL CALCULATED.3IONS-SCNC: 12 MMOL/L (ref 4–16)
BASOPHILS ABSOLUTE: 0 K/CU MM
BASOPHILS RELATIVE PERCENT: 0.2 % (ref 0–1)
BUN SERPL-MCNC: 27 MG/DL (ref 6–23)
CALCIUM SERPL-MCNC: 8.9 MG/DL (ref 8.3–10.6)
CHLORIDE BLD-SCNC: 102 MMOL/L (ref 99–110)
CO2: 20 MMOL/L (ref 21–32)
CREAT SERPL-MCNC: 0.8 MG/DL (ref 0.6–1.1)
DIFFERENTIAL TYPE: ABNORMAL
EOSINOPHILS ABSOLUTE: 0.2 K/CU MM
EOSINOPHILS RELATIVE PERCENT: 1 % (ref 0–3)
GFR SERPL CREATININE-BSD FRML MDRD: >60 ML/MIN/1.73M2
GLUCOSE SERPL-MCNC: 95 MG/DL (ref 70–99)
HCT VFR BLD CALC: 44.3 % (ref 37–47)
HEMOGLOBIN: 14.2 GM/DL (ref 12.5–16)
IMMATURE NEUTROPHIL %: 2.1 % (ref 0–0.43)
LYMPHOCYTES ABSOLUTE: 2.6 K/CU MM
LYMPHOCYTES RELATIVE PERCENT: 15.4 % (ref 24–44)
MCH RBC QN AUTO: 29.8 PG (ref 27–31)
MCHC RBC AUTO-ENTMCNC: 32.1 % (ref 32–36)
MCV RBC AUTO: 92.9 FL (ref 78–100)
MONOCYTES ABSOLUTE: 1.2 K/CU MM
MONOCYTES RELATIVE PERCENT: 6.7 % (ref 0–4)
NUCLEATED RBC %: 0 %
PDW BLD-RTO: 14.6 % (ref 11.7–14.9)
PLATELET # BLD: 284 K/CU MM (ref 140–440)
PMV BLD AUTO: 9.4 FL (ref 7.5–11.1)
POTASSIUM SERPL-SCNC: 4.9 MMOL/L (ref 3.5–5.1)
RBC # BLD: 4.77 M/CU MM (ref 4.2–5.4)
SEGMENTED NEUTROPHILS ABSOLUTE COUNT: 12.8 K/CU MM
SEGMENTED NEUTROPHILS RELATIVE PERCENT: 74.6 % (ref 36–66)
SODIUM BLD-SCNC: 134 MMOL/L (ref 135–145)
TOTAL IMMATURE NEUTOROPHIL: 0.36 K/CU MM
TOTAL NUCLEATED RBC: 0 K/CU MM
WBC # BLD: 17.1 K/CU MM (ref 4–10.5)

## 2023-02-22 PROCEDURE — 6360000004 HC RX CONTRAST MEDICATION: Performed by: STUDENT IN AN ORGANIZED HEALTH CARE EDUCATION/TRAINING PROGRAM

## 2023-02-22 PROCEDURE — 99233 SBSQ HOSP IP/OBS HIGH 50: CPT | Performed by: NURSE PRACTITIONER

## 2023-02-22 PROCEDURE — 85025 COMPLETE CBC W/AUTO DIFF WBC: CPT

## 2023-02-22 PROCEDURE — 36415 COLL VENOUS BLD VENIPUNCTURE: CPT

## 2023-02-22 PROCEDURE — 6370000000 HC RX 637 (ALT 250 FOR IP): Performed by: STUDENT IN AN ORGANIZED HEALTH CARE EDUCATION/TRAINING PROGRAM

## 2023-02-22 PROCEDURE — 70553 MRI BRAIN STEM W/O & W/DYE: CPT

## 2023-02-22 PROCEDURE — 80048 BASIC METABOLIC PNL TOTAL CA: CPT

## 2023-02-22 PROCEDURE — A9579 GAD-BASE MR CONTRAST NOS,1ML: HCPCS | Performed by: STUDENT IN AN ORGANIZED HEALTH CARE EDUCATION/TRAINING PROGRAM

## 2023-02-22 RX ORDER — ROSUVASTATIN CALCIUM 40 MG/1
40 TABLET, COATED ORAL NIGHTLY
Qty: 30 TABLET | Refills: 3 | Status: SHIPPED | OUTPATIENT
Start: 2023-02-22

## 2023-02-22 RX ADMIN — RIVAROXABAN 20 MG: 20 TABLET, FILM COATED ORAL at 09:25

## 2023-02-22 RX ADMIN — VENLAFAXINE HYDROCHLORIDE 37.5 MG: 37.5 CAPSULE, EXTENDED RELEASE ORAL at 09:25

## 2023-02-22 RX ADMIN — ASPIRIN 81 MG: 81 TABLET, COATED ORAL at 09:26

## 2023-02-22 RX ADMIN — GADOTERIDOL 14 ML: 279.3 INJECTION, SOLUTION INTRAVENOUS at 08:33

## 2023-02-22 NOTE — DISCHARGE SUMMARY
V2.0  Discharge Summary    Name:  Sailaja Bush /Age/Sex: 1933 (80 y.o. female)   Admit Date: 2023  Discharge Date: 23    MRN & CSN:  [id number] & [id number] Encounter Date and Time 23 1:19 PM EST    Attending:  Candance Griffon, MD Discharging Provider: Candance Griffon, MD       Discharge Diagnosess:     Suspected thalamic stroke (small, punctate, questionable if it truly represents stroke)  Hypertensive emergency  Unsteady gait  Factor 5 leiden  Hx of DVT/PE  Hypercoagulable state  Anxiety      Admission HPI, hospital course, and consultants:     Admission HPI:  Loren Delgado is a 80 y.o. female with pmh of factor V Leiden with prior PE/DVTs [on Arthuro Watchung, who presents with dizziness, blurry vision unsteady gait, and a headache. Patient states that the symptoms been ongoing for the past 2 days. Patient denies any falls and has not hit her head. At baseline she does not take any medications nor has she for blood pressure. On arrival patient was found to be hypertensive to the systolic of 002S over 285L. She was given a one-time dose of IV hydralazine that brought her blood pressure down into a systolic in the 483Z. Patient stated that this helped her headache. As patient does have a history of multiple clots there is concern for stroke. She did have a CT head which is reassuring that it was negative as her symptoms have been ongoing for the past 2 days. The only other symptom the patient does endorse that she has had some increased urinary frequency without burning. [facility] course:  Patient exhibited vague symptoms on presentation, including \"head feels weird\" and very anxious. No focal deficits appreciated, however MRI showed small punctate area of questionable significance that may represent stroke. Acuity uncertain. Neurology deemed Xarelto and statin sufficient for stroke prevention.      The patient expressed appropriate understanding of, and agreement with the discharge recommendations, medications, and plan. Consults this admission:  IP CONSULT TO NEUROLOGY  IP CONSULT TO ONCOLOGY    Discharge Instructions: Follow up appointments: neurology  Primary care physician: Patricia Negro MD within 2 weeks  Diet: regular diet   Activity: activity as tolerated  Disposition: Discharged to:   [x]Home, []HHC, []SNF, []Acute Rehab, []Hospice   Condition on discharge: Stable  Labs and Tests to be Followed up as an outpatient by PCP or Specialist: none    Discharge Medications:        Medication List        ASK your doctor about these medications      alendronate 70 MG tablet  Commonly known as: FOSAMAX  1 TABLET ORALLY ONCE A WEEK 90 DAYS     Calcium-Vitamin D 600-125 MG-UNIT Tabs     MULTI-VITAMIN DAILY PO     venlafaxine 37.5 MG extended release capsule  Commonly known as: EFFEXOR XR  Take 1 capsule by mouth daily     * Xarelto 20 MG Tabs tablet  Generic drug: rivaroxaban     * rivaroxaban 20 MG Tabs tablet  Commonly known as: Xarelto  Take 1 tablet by mouth daily (with breakfast)           * This list has 2 medication(s) that are the same as other medications prescribed for you. Read the directions carefully, and ask your doctor or other care provider to review them with you. Objective Findings at Discharge:   /81   Pulse (!) 107   Temp 98.1 °F (36.7 °C) (Oral)   Resp 20   Ht 5' 3\" (1.6 m)   Wt 156 lb 14.4 oz (71.2 kg)   SpO2 96%   BMI 27.79 kg/m²       Physical Exam:   General: NAD  Eyes: EOMI  ENT: neck supple  Cardiovascular: Regular rate. Respiratory: Clear to auscultation  Gastrointestinal: Soft, non tender  Genitourinary: no suprapubic tenderness  Musculoskeletal: No edema  Skin: warm, dry  Neuro: Alert. Psych: Mood appropriate.          Labs and Imaging   CT HEAD WO CONTRAST    Result Date: 2/20/2023  EXAMINATION: CT OF THE HEAD WITHOUT CONTRAST  2/20/2023 3:03 am TECHNIQUE: CT of the head was performed without the administration of intravenous contrast. Automated exposure control, iterative reconstruction, and/or weight based adjustment of the mA/kV was utilized to reduce the radiation dose to as low as reasonably achievable. COMPARISON: None. HISTORY: ORDERING SYSTEM PROVIDED HISTORY: head pain TECHNOLOGIST PROVIDED HISTORY: Has a \"code stroke\" or \"stroke alert\" been called? ->No Reason for exam:->head pain Reason for Exam: Head pain, High BP FINDINGS: BRAIN/VENTRICLES: There is no acute intracranial hemorrhage, mass effect or midline shift. No abnormal extra-axial fluid collection. The gray-white differentiation is maintained without evidence of an acute infarct. There is no evidence of hydrocephalus. Moderate periventricular and deep subcortical white matter hypodensity is present. Diffuse atrophy is noted. ORBITS: The visualized portion of the orbits demonstrate no acute abnormality. SINUSES: The visualized paranasal sinuses and mastoid air cells demonstrate no acute abnormality. SOFT TISSUES/SKULL:  No acute abnormality of the visualized skull or soft tissues. No acute intracranial abnormality. Age related changes including chronic small vessel ischemic disease and cerebral atrophy. XR CHEST PORTABLE    Result Date: 2/20/2023  EXAMINATION: ONE XRAY VIEW OF THE CHEST 2/20/2023 3:01 am COMPARISON: None. HISTORY: ORDERING SYSTEM PROVIDED HISTORY: chest pain TECHNOLOGIST PROVIDED HISTORY: Reason for exam:->chest pain Reason for Exam: chest pain FINDINGS: No lines or tubes. Normal cardiomediastinal silhouette. The lungs are clear without focal consolidation or pleural effusion. No suspicious pulmonary nodules. No pulmonary edema. No pneumothorax. No acute osseous abnormality. No acute cardiopulmonary disease.      CTA head neck with contrast    Result Date: 2/20/2023  EXAMINATION: CTA OF THE HEAD AND NECK WITH CONTRAST 2/20/2023 11:31 am: TECHNIQUE: CTA of the head and neck was performed with the administration of intravenous contrast. Multiplanar reformatted images are provided for review. MIP images are provided for review. Stenosis of the internal carotid arteries measured using NASCET criteria. Automated exposure control, iterative reconstruction, and/or weight based adjustment of the mA/kV was utilized to reduce the radiation dose to as low as reasonably achievable. COMPARISON: None. HISTORY: ORDERING SYSTEM PROVIDED HISTORY: R/O occlusion FINDINGS: CTA NECK: AORTIC ARCH/ARCH VESSELS: No dissection or arterial injury. No significant stenosis of the brachiocephalic or subclavian arteries. CAROTID ARTERIES: No dissection, arterial injury, or hemodynamically significant stenosis by NASCET criteria. Retropharyngeal course of the bilateral carotid arteries. VERTEBRAL ARTERIES: No hemodynamically significant stenosis or occlusion within the cervical vertebral arteries. 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 SOFT TISSUES: The lung apices are clear. No cervical or superior mediastinal lymphadenopathy. The larynx and pharynx are unremarkable. No acute abnormality of the salivary and thyroid glands. BONES: No acute osseous abnormality. CTA HEAD: ANTERIOR CIRCULATION: No significant stenosis of the intracranial internal carotid, anterior cerebral, or middle cerebral arteries. No aneurysm. POSTERIOR CIRCULATION: There is fetal origin left PCA. There is focal high-grade stenosis of the right proximal P2 segment. The basilar artery and intracranial vertebral arteries are patent without high-grade stenosis or occlusion. No aneurysm. OTHER: No dural venous sinus thrombosis on this non-dedicated study. BRAIN: No mass effect or midline shift. No extra-axial fluid collection. The gray-white differentiation is maintained. 1. No flow-limiting stenosis or occlusion within the neck.  2. 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 3. Focal high-grade stenosis of the right proximal P2 segment. 4. No evidence of large vessel occlusion within the intracranial circulation. RECOMMENDATIONS: Unavailable     MRI brain with and without contrast    Result Date: 2/22/2023  EXAMINATION: MRI OF THE BRAIN WITHOUT AND WITH CONTRAST  2/22/2023 7:54 am TECHNIQUE: Multiplanar multisequence MRI of the head/brain was performed without and with the administration of intravenous contrast. COMPARISON: None. HISTORY: ORDERING SYSTEM PROVIDED HISTORY: Headache with unsteady gait. Rule out stroke FINDINGS: INTRACRANIAL STRUCTURES/VENTRICLES: Questionable punctate lacunar infarct involving the dorsal left thalamus. No evidence of acute intracranial hemorrhage. There is no evidence of an intracranial mass or extraaxial fluid collection. No significant mass effect or midline shift. Patchy and early confluent foci of T2/FLAIR hyperintensity are present within supratentorial white matter which is a nonspecific finding but likely represents mild-to-moderate chronic microvascular ischemia. Scattered remote lacunar infarcts throughout the bilateral basal ganglia. There is mild generalized volume loss. Ventricular enlargement concordant with the degree of parenchymal volume loss. The sellar/suprasellar regions appear unremarkable. The normal signal voids within the major intracranial vessels appear maintained. No evidence of abnormal parenchymal or leptomeningeal enhancement. ORBITS: The visualized portion of the orbits demonstrate no acute abnormality. SINUSES: The visualized paranasal sinuses and mastoid air cells are well aerated. BONES/SOFT TISSUES: The bone marrow signal intensity appears normal. The soft tissues demonstrate no acute abnormality. 1. Questionable punctate lacunar infarct involving the dorsal left thalamus. 2. No acute intracranial hemorrhage or significant mass effect.  3. Mild-to-moderate chronic small vessel ischemic changes and remote scattered lacunar infarcts. RECOMMENDATIONS: Unavailable       CBC:   Recent Labs     02/20/23  0235 02/21/23  0521 02/22/23  0536   WBC 14.4* 14.4* 17.1*   HGB 16.4* 15.1 14.2    324 284     BMP:    Recent Labs     02/20/23  0235 02/21/23  0521 02/22/23  0536    136 134*   K 4.0 4.5 4.9    103 102   CO2 21 21 20*   BUN 22 27* 27*   CREATININE 0.7 0.8 0.8   GLUCOSE 111* 103* 95     Hepatic: No results for input(s): AST, ALT, ALB, BILITOT, ALKPHOS in the last 72 hours. Lipids:   Lab Results   Component Value Date/Time    CHOL 146 02/21/2023 05:21 AM    HDL 62 02/21/2023 05:21 AM    TRIG 97 02/21/2023 05:21 AM     Hemoglobin A1C:   Lab Results   Component Value Date/Time    LABA1C 5.9 02/21/2023 05:45 AM     TSH: No results found for: TSH  Troponin:   Lab Results   Component Value Date/Time    TROPONINT <0.010 02/20/2023 02:33 PM    TROPONINT <0.010 02/20/2023 02:35 AM     Lactic Acid: No results for input(s): LACTA in the last 72 hours. BNP: No results for input(s): PROBNP in the last 72 hours.   UA:No results found for: Nicolasa Berliner, PHUR, LABCAST, WBCUA, RBCUA, MUCUS, TRICHOMONAS, YEAST, BACTERIA, CLARITYU, SPECGRAV, LEUKOCYTESUR, UROBILINOGEN, BILIRUBINUR, BLOODU, GLUCOSEU, KETUA, AMORPHOUS  Urine Cultures: No results found for: LABURIN  Blood Cultures: No results found for: BC  No results found for: BLOODCULT2  Organism: No results found for: ORG    Time Spent Discharging patient 45 minutes    Electronically signed by Randolph Mcclain MD on 2/22/2023 at 1:19 PM

## 2023-02-22 NOTE — TELEPHONE ENCOUNTER
Care Transitions Initial Follow Up Call    Outreach made within 2 business days of discharge: Yes    Patient: [place] Patient : 1933   MRN: [id number]  Reason for Admission: There are no discharge diagnoses documented for the most recent discharge. Discharge Date: 23       Spoke with: Left message    Discharge department/facility: [facility]    TCM Interactive Patient Contact:  Was patient able to fill all prescriptions: Yes  Was patient instructed to bring all medications to the follow-up visit: Yes  Is patient taking all medications as directed in the discharge summary?  Yes  Does patient understand their discharge instructions: Yes  Does patient have questions or concerns that need addressed prior to 7-14 day follow up office visit: no    Scheduled appointment with PCP within 7-14 days    Follow Up  Future Appointments   Date Time Provider Marian Fisher   4/3/2023 11:15 AM Tretha Schilder, MD [facility]       Erica Meyer MA

## 2023-02-22 NOTE — PROGRESS NOTES
Outpatient Pharmacy Progress Note for Meds-to-Beds    Total number of Prescriptions Filled: 1  The following medications were dispensed to the patient during the discharge process:  Rosuvastatin     Additional Documentation:  Patient's family member picked-up the medication(s) in the OP Pharmacy      Thank you for letting us serve your patients.   [address] [place]    [address], [address] [facility]    Phone: [phone]    Fax: [phone]

## 2023-02-22 NOTE — PROGRESS NOTES
Neurology Service Progress Note  [address]   Patient Name: Rajesh Osborn  : 1933        Subjective:   CC: Stroke v HTN emergency with complaints of headache and unsteadiness  Chart was reviewed in detail. Patient was seen and assessed. She is in bed, daughter is at bedside. Patient states that she is feeling well today, back to her usual self. She is anxious to discharge home. Reviewed MRI results noting questionable punctate lacunar infarct in the dorsal left thalamus, chronic small vessel ischemic changes and remote scattered lacunar infarcts. Will have patient follow up outpatient. Patient does have history of factor V Leiden and has not been seen by hematology for quite some time.  Discussed with IM who will place referral.        Past Medical History:   Diagnosis Date    Anticoagulated     B12 deficiency     Depression     Diverticulosis     History of esophageal reflux     History of pulmonary embolus (PE)     History of recurrent deep vein thrombosis (DVT)     HTN (hypertension)     IBS (irritable bowel syndrome)     OA (osteoarthritis) of hip     :   Past Surgical History:   Procedure Laterality Date    ANKLE FRACTURE SURGERY  1976    APPENDECTOMY Bilateral 2009    CATARACT REMOVAL WITH IMPLANT Bilateral 2009    CORNEAL TRANSPLANT      2009    CYSTOCELE REPAIR  2009    HEMORRHOID SURGERY      RECTOCELE REPAIR  2009    SIGMOID COLECTOMY  2009    SMALL INTESTINE SURGERY  2009    JUANJOSE AND BSO (CERVIX REMOVED)  2009     Medications:  Scheduled Meds:   venlafaxine  37.5 mg Oral Daily    aspirin  81 mg Oral Daily    Or    aspirin  300 mg Rectal Daily    rivaroxaban  20 mg Oral Daily with breakfast    rosuvastatin  40 mg Oral Nightly     Continuous Infusions:   niCARdipine       PRN Meds:.ondansetron **OR** ondansetron, polyethylene glycol    Allergies   Allergen Reactions    Codeine Other (See Comments)    Vitamin K     Penicillin G Rash     Event:        Sulfa Antibiotics Rash     Event:       Social History     Socioeconomic History    Marital status: Unknown     Spouse name: Not on file    Number of children: Not on file    Years of education: Not on file    Highest education level: Not on file   Occupational History    Not on file   Tobacco Use    Smoking status: Never    Smokeless tobacco: Never   Vaping Use    Vaping Use: Never used   Substance and Sexual Activity    Alcohol use: Never    Drug use: Never    Sexual activity: Not on file   Other Topics Concern    Not on file   Social History Narrative    Not on file     Social Determinants of Health     Financial Resource Strain: Not on file   Food Insecurity: Not on file   Transportation Needs: Not on file   Physical Activity: Not on file   Stress: Not on file   Social Connections: Not on file   Intimate Partner Violence: Not on file   Housing Stability: Not on file      History reviewed. No pertinent family history.       ROS (10 systems)  In addition to that documented in the HPI above, the additional ROS was obtained:  Constitutional: Denies fevers or chills  Eyes: Denies vision changes  ENMT: Denies sore throat  CV: Denies chest pain  Resp: Denies SOB  GI: Denies vomiting or diarrhea  : Denies painful urination  MSK: Denies recent trauma  Skin: Denies new rashes  Neuro: \"Foggy\" thinking, headache, dizziness  Endocrine: Denies unexpected weight loss  Heme: Denies bleeding disorders    Physical Exam:      Wt Readings from Last 3 Encounters:   02/22/23 156 lb 14.4 oz (71.2 kg)   10/27/22 167 lb (75.8 kg)   10/17/22 165 lb 9.6 oz (75.1 kg)     Temp Readings from Last 3 Encounters:   02/22/23 97.7 °F (36.5 °C) (Oral)   10/17/22 (!) 96.6 °F (35.9 °C) (Infrared)     BP Readings from Last 3 Encounters:   02/22/23 134/70   10/27/22 128/70   10/17/22 (!) 140/86     Pulse Readings from Last 3 Encounters:   02/22/23 (!) 108   10/27/22 72   10/17/22 89        Gen: A&O x 4, NAD, cooperative  HEENT: NC/AT, EOMI, PERRL, mmm, neck supple, no meningeal signs  Heart: NSR  Lungs: Respirations even and unlabored  Ext: no edema, no calf tenderness b/l  Psych: normal mood and affect  skin: no rashes or lesions    NEUROLOGIC EXAM:    Mental Status: A&O to self, location, month and year, NAD, speech clear, language fluent, repetition and naming intact, follows commands appropriately    Cranial Nerve Exam:   CN II-XII: PERRL, VFF, no nystagmus, no gaze paresis, sensation V1-V3 intact b/l, muscles of facial expression symmetric; hearing intact to conversational tone, palate elevates symmetrically, shoulder elevation symmetric and tongue protrudes midline with movement side to side. Motor Exam:       Strength 5/5 UE's/LE's b/l  Tone and bulk normal   No pronator drift    Deep Tendon Reflexes: 1/4 biceps, triceps, brachioradialis, patellar, and achilles b/l; flexor plantar responses b/l    Sensation: Intact light touch/pinprick/vibration UE's/LE's b/l    Coordination/Cerebellum:       Tremors--none      Rapidly alternating movements: no dysdiadochokinesia b/l                Heel-to-Shin: no dysmetria b/l      Finger-to-Nose: no dysmetria b/l    Gait and stance:      Gait: deferred      LABS:     Recent Labs     02/20/23  0235 02/21/23  0521 02/22/23  0536   WBC 14.4* 14.4* 17.1*    136 134*   K 4.0 4.5 4.9    103 102   CO2 21 21 20*   BUN 22 27* 27*   CREATININE 0.7 0.8 0.8   GLUCOSE 111* 103* 95             IMAGING:    CT head w/o contrast:  Impression   No acute intracranial abnormality. Age related changes including chronic small vessel ischemic disease and   cerebral atrophy. CTA head and neck:  Impression   1. No flow-limiting stenosis or occlusion within the neck. 2. 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 3. Focal high-grade stenosis of the right proximal P2 segment.    4. No evidence of large vessel occlusion within the intracranial circulation. RECOMMENDATIONS:   Unavailable     MRI brain w/wo contrast:  Impression   1. Questionable punctate lacunar infarct involving the dorsal left thalamus. 2. No acute intracranial hemorrhage or significant mass effect. 3. Mild-to-moderate chronic small vessel ischemic changes and remote   scattered lacunar infarcts. All imaging was personally reviewed   ASSESSMENT/PLAN:   70-year-old female presenting with several day history of headache, dizziness, mild confusion. Patient is alert and oriented x4 today. Vision is intact, face is symmetric. Speech is clear, language is fluent. Strength and sensation are intact in the upper and lower extremities. Patient is a very good historian and is able to provide information about the past several days as well as remote history and monitoring of hypertension. Patient does take Xarelto as prescribed without any missed doses. There were no focal or lateralizing deficits on exam.  Dizziness, headache, mild confusion likely secondary to HTN encephalopathy v acute stroke. Neuroimaging as above  CTA findings note pseudoaneurysm involving the left V2/V3 junction and focal high-grade stenosis in the proximal P2 segment  MRI brain pending  Continue rosuvastatin and Xarelto for secondary stroke prevention  From neurology standpoint patient does not need to be on aspirin 81 mg in addition to Xarelto  LDL 62, A1C 5.9  Recommend SBP <140  PT/OT as recommended  From neurology standpoint patient is stable for discharge. We will sign off at this time. Please contact us with any new concerns. Follow up: Neuro intervention for CTA findings, in our office in 3 weeks for stroke follow up, hematology    Patient was discussed with attending neurologist Dr. Matt Castrejon       Thank you for allowing us to participate in the care of your patient. If there are any questions regarding evaluation please feel free to contact us.      0522 Jarad Armas TITO - CNP, 2/22/2023

## 2023-02-22 NOTE — PLAN OF CARE
Problem: Discharge Planning  Goal: Discharge to home or other facility with appropriate resources  2/22/2023 1342 by Cheo Tang  Outcome: Progressing  2/22/2023 0251 by My Hernandez RN  Outcome: Progressing

## 2023-02-25 LAB
CULTURE: NORMAL
CULTURE: NORMAL
Lab: NORMAL
Lab: NORMAL
SPECIMEN: NORMAL
SPECIMEN: NORMAL

## 2023-03-07 ENCOUNTER — OFFICE (OUTPATIENT)
Dept: URBAN - METROPOLITAN AREA CLINIC 16 | Facility: CLINIC | Age: 88
End: 2023-03-07

## 2023-03-07 VITALS
HEART RATE: 81 BPM | SYSTOLIC BLOOD PRESSURE: 118 MMHG | HEIGHT: 64 IN | DIASTOLIC BLOOD PRESSURE: 70 MMHG | WEIGHT: 164 LBS | OXYGEN SATURATION: 93 %

## 2023-03-07 DIAGNOSIS — K52.9 NONINFECTIVE GASTROENTERITIS AND COLITIS, UNSPECIFIED: ICD-10-CM

## 2023-03-07 PROCEDURE — 99204 OFFICE O/P NEW MOD 45 MIN: CPT | Performed by: INTERNAL MEDICINE

## 2023-03-07 NOTE — SERVICENOTES
Peg is clinically improving and does have some loose bowels after using antibiotics and does have colitis.  If she has further issues she may opt for evaluation for colonoscopy and would need clearance to hold anticoagulation

## 2023-03-07 NOTE — SERVICEHPINOTES
Keri Bernal   is a   89   year old   female   patient who is seen   at the request of   [facility]   for a consultation/initial visit.    and presented for consultation at this time for diverticulitis. She had acute diverticulitis with terminal ileitis and new descending colitis seen on imaging. She had polymicrobial bacteremia and change in bowels with constipation she does have history of DVT and PE and a factor V Leiden deficiency for that risk. She has some anxiety and depression otherwise. She has had basal cell carcinoma and COPD history along with a history of IBS with her history of diverticular disease. She had osteoporosis in the past as well. And she notes to have history of DVT PE as noted as above. Regardless she is 89 years old and had been admitted to the hospital on February 25, 2023 with abdominal pain and diagnosed with terminal ileitis and diverticulitis and started on IV therapy and general surgery was consulted. She was recommended to have outpatient colonoscopy once improved from a stability standpoint for the procedure as per guidelines for diverticular disease and treatment. She was given Augmentin on discharge and the recommendations of ID with penicillin allergy changed to Ceftin and Flagyl
dean Stark are able to review her CAT scan with improved wall thickening on February 2023 and inflammatory changes to be improved of the ileum when compared to February 25, 2023 there was new wall thickening and inflammatory changes however in the distal descending colon related to focal colitis. 
dean moran
deanBrie is living with her daughter Luna now and she is originally from [place]  . Regardless she had recent findings of the colitis and she is improving on antibiotics. We discussed options today to be proactive and have evaluation but she wants to be conservative and is improved clinically regardless if issues occur furthermore she may schedule a colonoscopy she would need clearance to have the exam done to hold blood thinners with the factor V deficiency and history of DVT and PE. The test may be better served if done at outpatient at [place] or [facility] at this time we would like to be conservative as we discussed with both Luna and BRIE

## 2023-03-20 ENCOUNTER — APPOINTMENT (OUTPATIENT)
Dept: CT IMAGING | Age: 88
End: 2023-03-20
Payer: MEDICARE

## 2023-03-20 ENCOUNTER — HOSPITAL ENCOUNTER (EMERGENCY)
Age: 88
Discharge: HOME | End: 2023-03-20
Attending: EMERGENCY MEDICINE
Payer: MEDICARE

## 2023-03-20 VITALS
HEIGHT: 64 IN | OXYGEN SATURATION: 94 % | DIASTOLIC BLOOD PRESSURE: 82 MMHG | TEMPERATURE: 97.9 F | SYSTOLIC BLOOD PRESSURE: 144 MMHG | WEIGHT: 159.9 LBS | RESPIRATION RATE: 15 BRPM | HEART RATE: 89 BPM | BODY MASS INDEX: 27.3 KG/M2

## 2023-03-20 DIAGNOSIS — R19.7 DIARRHEA, UNSPECIFIED TYPE: Primary | ICD-10-CM

## 2023-03-20 DIAGNOSIS — K52.9 GASTROENTERITIS: ICD-10-CM

## 2023-03-20 DIAGNOSIS — R10.9 ABDOMINAL PAIN, UNSPECIFIED ABDOMINAL LOCATION: ICD-10-CM

## 2023-03-20 LAB
ALBUMIN SERPL-MCNC: 3.9 GM/DL (ref 3.4–5)
ALP BLD-CCNC: 80 IU/L (ref 40–129)
ALT SERPL-CCNC: 18 U/L (ref 10–40)
ANION GAP SERPL CALCULATED.3IONS-SCNC: 15 MMOL/L (ref 4–16)
AST SERPL-CCNC: 28 IU/L (ref 15–37)
BACTERIA: ABNORMAL /HPF
BASOPHILS ABSOLUTE: 0.1 K/CU MM
BASOPHILS RELATIVE PERCENT: 0.7 % (ref 0–1)
BILIRUB SERPL-MCNC: 0.4 MG/DL (ref 0–1)
BILIRUBIN URINE: NEGATIVE MG/DL
BLOOD, URINE: NEGATIVE
BUN SERPL-MCNC: 12 MG/DL (ref 6–23)
CALCIUM SERPL-MCNC: 9.5 MG/DL (ref 8.3–10.6)
CAST TYPE: ABNORMAL /HPF
CHLORIDE BLD-SCNC: 102 MMOL/L (ref 99–110)
CLARITY: CLEAR
CO2: 21 MMOL/L (ref 21–32)
COLOR: YELLOW
CREAT SERPL-MCNC: 0.6 MG/DL (ref 0.6–1.1)
CRYSTAL TYPE: NEGATIVE /HPF
DIFFERENTIAL TYPE: ABNORMAL
EOSINOPHILS ABSOLUTE: 0.1 K/CU MM
EOSINOPHILS RELATIVE PERCENT: 0.6 % (ref 0–3)
EPITHELIAL CELLS, UA: 5 /HPF
GFR SERPL CREATININE-BSD FRML MDRD: >60 ML/MIN/1.73M2
GLUCOSE SERPL-MCNC: 119 MG/DL (ref 70–99)
GLUCOSE, URINE: NEGATIVE MG/DL
HCT VFR BLD CALC: 42 % (ref 37–47)
HEMOGLOBIN: 13.7 GM/DL (ref 12.5–16)
IMMATURE NEUTROPHIL %: 0.8 % (ref 0–0.43)
KETONES, URINE: NEGATIVE MG/DL
LACTATE: 1.9 MMOL/L (ref 0.5–1.9)
LEUKOCYTE ESTERASE, URINE: NEGATIVE
LIPASE: 166 IU/L (ref 13–60)
LYMPHOCYTES ABSOLUTE: 0.8 K/CU MM
LYMPHOCYTES RELATIVE PERCENT: 7.6 % (ref 24–44)
MCH RBC QN AUTO: 30.3 PG (ref 27–31)
MCHC RBC AUTO-ENTMCNC: 32.6 % (ref 32–36)
MCV RBC AUTO: 92.9 FL (ref 78–100)
MONOCYTES ABSOLUTE: 0.5 K/CU MM
MONOCYTES RELATIVE PERCENT: 4.7 % (ref 0–4)
NITRITE URINE, QUANTITATIVE: NEGATIVE
PDW BLD-RTO: 15.4 % (ref 11.7–14.9)
PH, URINE: 5.5 (ref 5–8)
PLATELET # BLD: 291 K/CU MM (ref 140–440)
PMV BLD AUTO: 9.4 FL (ref 7.5–11.1)
POTASSIUM SERPL-SCNC: 3.9 MMOL/L (ref 3.5–5.1)
PROTEIN UA: ABNORMAL MG/DL
RAPID INFLUENZA  B AGN: NEGATIVE
RAPID INFLUENZA A AGN: NEGATIVE
RBC # BLD: 4.52 M/CU MM (ref 4.2–5.4)
RBC URINE: 0 /HPF (ref 0–6)
SARS-COV-2 RDRP RESP QL NAA+PROBE: NOT DETECTED
SEGMENTED NEUTROPHILS ABSOLUTE COUNT: 8.7 K/CU MM
SEGMENTED NEUTROPHILS RELATIVE PERCENT: 85.6 % (ref 36–66)
SODIUM BLD-SCNC: 138 MMOL/L (ref 135–145)
SOURCE: NORMAL
SPECIFIC GRAVITY UA: >1.03 (ref 1–1.03)
TOTAL IMMATURE NEUTOROPHIL: 0.08 K/CU MM
TOTAL PROTEIN: 7.1 GM/DL (ref 6.4–8.2)
UROBILINOGEN, URINE: 0.2 MG/DL (ref 0.2–1)
WBC # BLD: 10.2 K/CU MM (ref 4–10.5)
WBC UA: 4 /HPF (ref 0–5)

## 2023-03-20 PROCEDURE — 6360000002 HC RX W HCPCS: Performed by: EMERGENCY MEDICINE

## 2023-03-20 PROCEDURE — 81001 URINALYSIS AUTO W/SCOPE: CPT

## 2023-03-20 PROCEDURE — A4216 STERILE WATER/SALINE, 10 ML: HCPCS | Performed by: EMERGENCY MEDICINE

## 2023-03-20 PROCEDURE — 99285 EMERGENCY DEPT VISIT HI MDM: CPT

## 2023-03-20 PROCEDURE — 96375 TX/PRO/DX INJ NEW DRUG ADDON: CPT

## 2023-03-20 PROCEDURE — 2580000003 HC RX 258: Performed by: EMERGENCY MEDICINE

## 2023-03-20 PROCEDURE — 96361 HYDRATE IV INFUSION ADD-ON: CPT

## 2023-03-20 PROCEDURE — 87804 INFLUENZA ASSAY W/OPTIC: CPT

## 2023-03-20 PROCEDURE — 74177 CT ABD & PELVIS W/CONTRAST: CPT

## 2023-03-20 PROCEDURE — 2500000003 HC RX 250 WO HCPCS: Performed by: EMERGENCY MEDICINE

## 2023-03-20 PROCEDURE — 6360000004 HC RX CONTRAST MEDICATION: Performed by: EMERGENCY MEDICINE

## 2023-03-20 PROCEDURE — 87449 NOS EACH ORGANISM AG IA: CPT

## 2023-03-20 PROCEDURE — 83690 ASSAY OF LIPASE: CPT

## 2023-03-20 PROCEDURE — 6370000000 HC RX 637 (ALT 250 FOR IP): Performed by: EMERGENCY MEDICINE

## 2023-03-20 PROCEDURE — 85025 COMPLETE CBC W/AUTO DIFF WBC: CPT

## 2023-03-20 PROCEDURE — 87635 SARS-COV-2 COVID-19 AMP PRB: CPT

## 2023-03-20 PROCEDURE — 87324 CLOSTRIDIUM AG IA: CPT

## 2023-03-20 PROCEDURE — 80053 COMPREHEN METABOLIC PANEL: CPT

## 2023-03-20 PROCEDURE — 96374 THER/PROPH/DIAG INJ IV PUSH: CPT

## 2023-03-20 PROCEDURE — 83605 ASSAY OF LACTIC ACID: CPT

## 2023-03-20 RX ORDER — DICYCLOMINE HYDROCHLORIDE 10 MG/1
20 CAPSULE ORAL ONCE
Status: COMPLETED | OUTPATIENT
Start: 2023-03-20 | End: 2023-03-20

## 2023-03-20 RX ORDER — DICYCLOMINE HCL 20 MG
10 TABLET ORAL 4 TIMES DAILY
Qty: 20 TABLET | Refills: 0 | Status: SHIPPED | OUTPATIENT
Start: 2023-03-20 | End: 2023-03-30

## 2023-03-20 RX ORDER — ONDANSETRON 4 MG/1
4 TABLET, ORALLY DISINTEGRATING ORAL 3 TIMES DAILY PRN
Qty: 21 TABLET | Refills: 0 | Status: SHIPPED | OUTPATIENT
Start: 2023-03-20

## 2023-03-20 RX ORDER — 0.9 % SODIUM CHLORIDE 0.9 %
1000 INTRAVENOUS SOLUTION INTRAVENOUS ONCE
Status: COMPLETED | OUTPATIENT
Start: 2023-03-20 | End: 2023-03-20

## 2023-03-20 RX ORDER — FAMOTIDINE 20 MG/1
20 TABLET, FILM COATED ORAL 2 TIMES DAILY
Qty: 14 TABLET | Refills: 0 | Status: SHIPPED | OUTPATIENT
Start: 2023-03-20

## 2023-03-20 RX ORDER — ONDANSETRON 2 MG/ML
4 INJECTION INTRAMUSCULAR; INTRAVENOUS EVERY 30 MIN PRN
Status: DISCONTINUED | OUTPATIENT
Start: 2023-03-20 | End: 2023-03-20 | Stop reason: HOSPADM

## 2023-03-20 RX ORDER — 0.9 % SODIUM CHLORIDE 0.9 %
1000 INTRAVENOUS SOLUTION INTRAVENOUS ONCE
Status: DISCONTINUED | OUTPATIENT
Start: 2023-03-20 | End: 2023-03-20 | Stop reason: HOSPADM

## 2023-03-20 RX ADMIN — ONDANSETRON 4 MG: 2 INJECTION INTRAMUSCULAR; INTRAVENOUS at 13:47

## 2023-03-20 RX ADMIN — SODIUM CHLORIDE 1000 ML: 9 INJECTION, SOLUTION INTRAVENOUS at 13:47

## 2023-03-20 RX ADMIN — IOPAMIDOL 75 ML: 755 INJECTION, SOLUTION INTRAVENOUS at 14:19

## 2023-03-20 RX ADMIN — DICYCLOMINE HYDROCHLORIDE 20 MG: 10 CAPSULE ORAL at 13:47

## 2023-03-20 RX ADMIN — FAMOTIDINE 20 MG: 10 INJECTION, SOLUTION INTRAVENOUS at 13:47

## 2023-03-20 ASSESSMENT — PAIN DESCRIPTION - FREQUENCY: FREQUENCY: CONTINUOUS

## 2023-03-20 ASSESSMENT — ENCOUNTER SYMPTOMS
EYES NEGATIVE: 1
DIARRHEA: 1
NAUSEA: 1
ABDOMINAL PAIN: 1
ALLERGIC/IMMUNOLOGIC NEGATIVE: 1
RESPIRATORY NEGATIVE: 1

## 2023-03-20 ASSESSMENT — LIFESTYLE VARIABLES
HOW MANY STANDARD DRINKS CONTAINING ALCOHOL DO YOU HAVE ON A TYPICAL DAY: PATIENT DOES NOT DRINK
HOW OFTEN DO YOU HAVE A DRINK CONTAINING ALCOHOL: NEVER

## 2023-03-20 ASSESSMENT — PAIN SCALES - GENERAL: PAINLEVEL_OUTOF10: 5

## 2023-03-20 ASSESSMENT — PAIN DESCRIPTION - LOCATION: LOCATION: ABDOMEN

## 2023-03-20 ASSESSMENT — PAIN DESCRIPTION - ORIENTATION: ORIENTATION: LOWER

## 2023-03-20 ASSESSMENT — PAIN - FUNCTIONAL ASSESSMENT
PAIN_FUNCTIONAL_ASSESSMENT: PREVENTS OR INTERFERES SOME ACTIVE ACTIVITIES AND ADLS
PAIN_FUNCTIONAL_ASSESSMENT: 0-10

## 2023-03-20 ASSESSMENT — PAIN DESCRIPTION - DESCRIPTORS: DESCRIPTORS: DISCOMFORT

## 2023-03-20 ASSESSMENT — PAIN DESCRIPTION - PAIN TYPE: TYPE: ACUTE PAIN

## 2023-03-20 ASSESSMENT — PAIN DESCRIPTION - ONSET: ONSET: PROGRESSIVE

## 2023-03-21 LAB
C DIFF AG + TOXIN: NORMAL
SOURCE: NORMAL

## 2023-04-28 ENCOUNTER — HOSPITAL ENCOUNTER (EMERGENCY)
Age: 88
Discharge: HOME | End: 2023-04-28
Attending: EMERGENCY MEDICINE
Payer: MEDICARE

## 2023-04-28 VITALS
RESPIRATION RATE: 18 BRPM | BODY MASS INDEX: 26.95 KG/M2 | SYSTOLIC BLOOD PRESSURE: 143 MMHG | OXYGEN SATURATION: 99 % | TEMPERATURE: 97.9 F | WEIGHT: 157 LBS | HEART RATE: 87 BPM | DIASTOLIC BLOOD PRESSURE: 82 MMHG

## 2023-04-28 DIAGNOSIS — R42 LIGHTHEADEDNESS: Primary | ICD-10-CM

## 2023-04-28 LAB
ALBUMIN SERPL-MCNC: 3.7 GM/DL (ref 3.4–5)
ALP BLD-CCNC: 80 IU/L (ref 40–129)
ALT SERPL-CCNC: 12 U/L (ref 10–40)
ANION GAP SERPL CALCULATED.3IONS-SCNC: 12 MMOL/L (ref 4–16)
AST SERPL-CCNC: 40 IU/L (ref 15–37)
BASOPHILS ABSOLUTE: 0.1 K/CU MM
BASOPHILS RELATIVE PERCENT: 0.9 % (ref 0–1)
BILIRUB SERPL-MCNC: 0.2 MG/DL (ref 0–1)
BILIRUBIN URINE: NEGATIVE MG/DL
BLOOD, URINE: NEGATIVE
BUN SERPL-MCNC: 15 MG/DL (ref 6–23)
CALCIUM SERPL-MCNC: 9.8 MG/DL (ref 8.3–10.6)
CHLORIDE BLD-SCNC: 103 MMOL/L (ref 99–110)
CLARITY: CLEAR
CO2: 24 MMOL/L (ref 21–32)
COLOR: YELLOW
COMMENT UA: NORMAL
CREAT SERPL-MCNC: 0.8 MG/DL (ref 0.6–1.1)
DIFFERENTIAL TYPE: ABNORMAL
EOSINOPHILS ABSOLUTE: 0.3 K/CU MM
EOSINOPHILS RELATIVE PERCENT: 3.2 % (ref 0–3)
GFR SERPL CREATININE-BSD FRML MDRD: >60 ML/MIN/1.73M2
GLUCOSE SERPL-MCNC: 145 MG/DL (ref 70–99)
GLUCOSE, URINE: NEGATIVE MG/DL
HCT VFR BLD CALC: 40.1 % (ref 37–47)
HEMOGLOBIN: 12.8 GM/DL (ref 12.5–16)
IMMATURE NEUTROPHIL %: 0.4 % (ref 0–0.43)
KETONES, URINE: NEGATIVE MG/DL
LEUKOCYTE ESTERASE, URINE: NEGATIVE
LIPASE: 40 IU/L (ref 13–60)
LYMPHOCYTES ABSOLUTE: 1.6 K/CU MM
LYMPHOCYTES RELATIVE PERCENT: 19.9 % (ref 24–44)
MCH RBC QN AUTO: 29.9 PG (ref 27–31)
MCHC RBC AUTO-ENTMCNC: 31.9 % (ref 32–36)
MCV RBC AUTO: 93.7 FL (ref 78–100)
MONOCYTES ABSOLUTE: 0.6 K/CU MM
MONOCYTES RELATIVE PERCENT: 7.8 % (ref 0–4)
NITRITE URINE, QUANTITATIVE: NEGATIVE
PDW BLD-RTO: 15 % (ref 11.7–14.9)
PH, URINE: 5.5 (ref 5–8)
PLATELET # BLD: 300 K/CU MM (ref 140–440)
PMV BLD AUTO: 9.3 FL (ref 7.5–11.1)
POTASSIUM SERPL-SCNC: 4.6 MMOL/L (ref 3.5–5.1)
PROTEIN UA: NEGATIVE MG/DL
RBC # BLD: 4.28 M/CU MM (ref 4.2–5.4)
SEGMENTED NEUTROPHILS ABSOLUTE COUNT: 5.3 K/CU MM
SEGMENTED NEUTROPHILS RELATIVE PERCENT: 67.8 % (ref 36–66)
SODIUM BLD-SCNC: 139 MMOL/L (ref 135–145)
SPECIFIC GRAVITY UA: 1.02 (ref 1–1.03)
TOTAL IMMATURE NEUTOROPHIL: 0.03 K/CU MM
TOTAL PROTEIN: 7.2 GM/DL (ref 6.4–8.2)
UROBILINOGEN, URINE: 0.2 MG/DL (ref 0.2–1)
WBC # BLD: 7.8 K/CU MM (ref 4–10.5)

## 2023-04-28 PROCEDURE — 81003 URINALYSIS AUTO W/O SCOPE: CPT

## 2023-04-28 PROCEDURE — 99283 EMERGENCY DEPT VISIT LOW MDM: CPT

## 2023-04-28 PROCEDURE — 83690 ASSAY OF LIPASE: CPT

## 2023-04-28 PROCEDURE — 80053 COMPREHEN METABOLIC PANEL: CPT

## 2023-04-28 PROCEDURE — 85025 COMPLETE CBC W/AUTO DIFF WBC: CPT

## 2023-04-28 ASSESSMENT — ENCOUNTER SYMPTOMS
BLOOD IN STOOL: 0
DIARRHEA: 0
NAUSEA: 1
VOMITING: 0
VISUAL CHANGE: 0
RESPIRATORY NEGATIVE: 1
EYES NEGATIVE: 1
SHORTNESS OF BREATH: 0

## 2023-04-29 NOTE — ED PROVIDER NOTES
The history is provided by the patient and a relative. Dizziness  Quality:  Lightheadedness  Severity:  Mild  Timing:  Sporadic  Progression:  Waxing and waning  Context: standing up    Relieved by:  Nothing  Worsened by:  Nothing  Ineffective treatments:  None tried  Associated symptoms: nausea    Associated symptoms: no blood in stool, no chest pain, no diarrhea, no headaches, no hearing loss, no palpitations, no shortness of breath, no syncope, no tinnitus, no vision changes, no vomiting and no weakness    Associated symptoms comment:  She also has a fullness feeling in her lower abdomen like she has to go to [facility] but she doesn't. She is concerned this could be infection. She has had no focal neurological deficits and the lightheadedness is only with standing    Review of Systems   Constitutional: Negative. HENT: Negative. Negative for hearing loss and tinnitus. Eyes: Negative. Respiratory: Negative. Negative for shortness of breath. Cardiovascular: Negative. Negative for chest pain, palpitations and syncope. Gastrointestinal:  Positive for nausea. Negative for blood in stool, diarrhea and vomiting. Genitourinary: Negative. Musculoskeletal: Negative. Skin: Negative. Neurological:  Positive for dizziness. Negative for weakness and headaches. All other systems reviewed and are negative. History reviewed. No pertinent family history.   Social History     Socioeconomic History    Marital status: Unknown     Spouse name: Not on file    Number of children: Not on file    Years of education: Not on file    Highest education level: Not on file   Occupational History    Not on file   Tobacco Use    Smoking status: Never    Smokeless tobacco: Never   Vaping Use    Vaping Use: Never used   Substance and Sexual Activity    Alcohol use: Never    Drug use: Never    Sexual activity: Not on file   Other Topics Concern    Not on file   Social History Narrative    Not on file     Social Determinants

## 2023-04-29 NOTE — ED NOTES
Discharge instructions given per SAMIRA Louis. Pt voiced understanding. Ambulatory to exit without incident.       Dagmar Bright RN  04/28/23 [id number]

## 2024-01-01 ENCOUNTER — HOSPITAL ENCOUNTER (EMERGENCY)
Age: 89
Discharge: HOME | End: 2024-01-01
Attending: EMERGENCY MEDICINE
Payer: MEDICARE

## 2024-01-01 VITALS
SYSTOLIC BLOOD PRESSURE: 139 MMHG | TEMPERATURE: 98 F | BODY MASS INDEX: 25.83 KG/M2 | RESPIRATION RATE: 16 BRPM | HEART RATE: 76 BPM | DIASTOLIC BLOOD PRESSURE: 81 MMHG | OXYGEN SATURATION: 98 % | WEIGHT: 155 LBS | HEIGHT: 65 IN

## 2024-01-01 DIAGNOSIS — H10.9 CONJUNCTIVITIS OF BOTH EYES, UNSPECIFIED CONJUNCTIVITIS TYPE: ICD-10-CM

## 2024-01-01 DIAGNOSIS — J06.9 ACUTE UPPER RESPIRATORY INFECTION: Primary | ICD-10-CM

## 2024-01-01 PROCEDURE — 99283 EMERGENCY DEPT VISIT LOW MDM: CPT

## 2024-01-01 RX ORDER — GUAIFENESIN/DEXTROMETHORPHAN 100-10MG/5
5 SYRUP ORAL 4 TIMES DAILY PRN
Qty: 120 ML | Refills: 0 | Status: SHIPPED | OUTPATIENT
Start: 2024-01-01 | End: 2024-01-11

## 2024-01-01 RX ORDER — POLYMYXIN B SULFATE AND TRIMETHOPRIM 1; 10000 MG/ML; [USP'U]/ML
1 SOLUTION OPHTHALMIC EVERY 4 HOURS
Qty: 3 ML | Refills: 0 | Status: SHIPPED | OUTPATIENT
Start: 2024-01-01 | End: 2024-01-11

## 2024-01-01 ASSESSMENT — PAIN - FUNCTIONAL ASSESSMENT: PAIN_FUNCTIONAL_ASSESSMENT: 0-10

## 2024-01-01 ASSESSMENT — PAIN DESCRIPTION - DESCRIPTORS: DESCRIPTORS: ACHING;BURNING

## 2024-01-01 ASSESSMENT — PAIN DESCRIPTION - LOCATION: LOCATION: EYE

## 2024-01-01 ASSESSMENT — PAIN SCALES - GENERAL: PAINLEVEL_OUTOF10: 9

## 2024-01-01 ASSESSMENT — PAIN DESCRIPTION - PAIN TYPE: TYPE: ACUTE PAIN

## 2024-01-01 ASSESSMENT — PAIN DESCRIPTION - ORIENTATION: ORIENTATION: RIGHT;LEFT

## 2024-01-01 NOTE — ED NOTES
Discharge instructions and prescription information was given to the patient who expressed understanding of information and follow up care.

## 2024-01-01 NOTE — ED TRIAGE NOTES
Complaints of bilateral eye drainage and sore throat. Symptoms started about a week ago, eye drainage for the past few days, denies any fever or chills.

## 2024-01-01 NOTE — ED PROVIDER NOTES
Emergency Department Encounter    Patient: Pippa Mcbride  MRN: [id number]  : 1933  Date of Evaluation: 2024  ED Provider:  Rachel Montes MD    Triage Chief Complaint:   Eye Drainage and Sore Throat    [place]:  Pippa Mcbride is a 89 y.o. female that presents with complaint of bilateral eye drainage, sore throat, nasal congestion and drainage, cough.  No shortness of breath.  No chest pain.  No vomiting or diarrhea.  No leg swelling.  She is over at [email or web address], has been eating in her room.  Has been sick for about 4 to 5 days.  No measured fevers.    ROS - see HPI, below listed is current ROS at time of my eval:  10 systems reviewed and negative except as above.     Past Medical History:   Diagnosis Date    Anticoagulated     B12 deficiency     Depression     Diverticulosis     History of esophageal reflux     History of pulmonary embolus (PE)     History of recurrent deep vein thrombosis (DVT)     HTN (hypertension)     IBS (irritable bowel syndrome)     OA (osteoarthritis) of hip      Past Surgical History:   Procedure Laterality Date    ANKLE FRACTURE SURGERY  1976    APPENDECTOMY Bilateral 2009    CATARACT REMOVAL WITH IMPLANT Bilateral 2009    CORNEAL TRANSPLANT      2009    CYSTOCELE REPAIR  2009    HEMORRHOID SURGERY      RECTOCELE REPAIR  2009    SIGMOID COLECTOMY  2009    SMALL INTESTINE SURGERY  2009    JUANJOSE AND BSO (CERVIX REMOVED)  2009     History reviewed. No pertinent family history.  Social History     Socioeconomic History    Marital status: Unknown     Spouse name: Not on file    Number of children: Not on file    Years of education: Not on file    Highest education level: Not on file   Occupational History    Not on file   Tobacco Use    Smoking status: Never    Smokeless tobacco: Never   Vaping Use    Vaping Use: Never used   Substance and Sexual Activity    Alcohol use: Never    Drug use: Never    Sexual activity: Not on file   Other

## 2024-01-01 NOTE — DISCHARGE INSTR - COC
vein thrombosis (DVT) Z86.718    History of pulmonary embolus (PE) Z86.711    History of esophageal reflux Z87.19    Hypertensive emergency I16.1       Isolation/Infection:   Isolation            No Isolation          Patient Infection Status       None to display                     Nurse Assessment:  Last Vital Signs: /81   Pulse 76   Temp 98 °F (36.7 °C) (Oral)   Resp 16   Ht 1.651 m (5' 5\")   Wt 70.3 kg (155 lb)   SpO2 98%   BMI 25.79 kg/m²     Last documented pain score (0-10 scale): Pain Level: 9  Last Weight:   Wt Readings from Last 1 Encounters:   01/01/24 70.3 kg (155 lb)     Mental Status:  {IP PT MENTAL STATUS:20030}    IV Access:  { RUTHIE IV ACCESS:809932406}    Nursing Mobility/ADLs:  Walking   {CHP DME ADLs:934594062}  Transfer  {CHP DME ADLs:239246481}  Bathing  {CHP DME ADLs:748691375}  Dressing  {CHP DME ADLs:061635846}  Toileting  {CHP DME ADLs:465718609}  Feeding  {CHP DME ADLs:642244438}  Med Admin  {P DME ADLs:913974420}  Med Delivery   { RUTHIE MED Delivery:959125056}    Wound Care Documentation and Therapy:        Elimination:  Continence:   Bowel: {YES / NO:19727}  Bladder: {YES / NO:19727}  Urinary Catheter: {Urinary Catheter:859532787}   Colostomy/Ileostomy/Ileal Conduit: {YES / NO:19727}       Date of Last BM: ***  No intake or output data in the 24 hours ending 01/01/24 1258  No intake/output data recorded.    Safety Concerns:     { RUTHIE Safety Concerns:384615678}    Impairments/Disabilities:      { RUTHIE Impairments/Disabilities:035714890}    Nutrition Therapy:  Current Nutrition Therapy:   { RUTHIE Diet List:998624353}    Routes of Feeding: {CHP DME Other Feedings:298419365}  Liquids: {Slp liquid thickness:35854}  Daily Fluid Restriction: {CHP DME Yes amt example:439299573}  Last Modified Barium Swallow with Video (Video Swallowing Test): {Done Not Done Date:304088012}    Treatments at the Time of Hospital Discharge:   Respiratory Treatments: ***  Oxygen Therapy:

## 2024-01-29 ENCOUNTER — INPATIENT HOSPITAL (OUTPATIENT)
Dept: URBAN - METROPOLITAN AREA HOSPITAL 104 | Facility: HOSPITAL | Age: 89
End: 2024-01-29
Payer: MEDICARE

## 2024-01-29 DIAGNOSIS — F41.8 OTHER SPECIFIED ANXIETY DISORDERS: ICD-10-CM

## 2024-01-29 DIAGNOSIS — D62 ACUTE POSTHEMORRHAGIC ANEMIA: ICD-10-CM

## 2024-01-29 DIAGNOSIS — D68.2 HEREDITARY DEFICIENCY OF OTHER CLOTTING FACTORS: ICD-10-CM

## 2024-01-29 DIAGNOSIS — K62.5 HEMORRHAGE OF ANUS AND RECTUM: ICD-10-CM

## 2024-01-29 PROCEDURE — 99222 1ST HOSP IP/OBS MODERATE 55: CPT | Mod: FS | Performed by: NURSE PRACTITIONER

## 2024-01-30 PROCEDURE — 99232 SBSQ HOSP IP/OBS MODERATE 35: CPT | Mod: FS | Performed by: NURSE PRACTITIONER

## 2024-01-31 ENCOUNTER — INPATIENT HOSPITAL (OUTPATIENT)
Dept: URBAN - METROPOLITAN AREA HOSPITAL 104 | Facility: HOSPITAL | Age: 89
End: 2024-01-31
Payer: MEDICARE

## 2024-01-31 DIAGNOSIS — D12.2 BENIGN NEOPLASM OF ASCENDING COLON: ICD-10-CM

## 2024-01-31 DIAGNOSIS — K57.30 DIVERTICULOSIS OF LARGE INTESTINE WITHOUT PERFORATION OR ABS: ICD-10-CM

## 2024-01-31 DIAGNOSIS — K62.5 HEMORRHAGE OF ANUS AND RECTUM: ICD-10-CM

## 2024-01-31 PROCEDURE — 45385 COLONOSCOPY W/LESION REMOVAL: CPT | Performed by: INTERNAL MEDICINE

## 2024-02-01 ENCOUNTER — TELEPHONE (OUTPATIENT)
Dept: FAMILY MEDICINE CLINIC | Age: 89
End: 2024-02-01

## 2024-02-01 NOTE — TELEPHONE ENCOUNTER
----- Message from Ivy Soares sent at 2/1/2024  1:22 PM EST -----  Subject: Appointment Request    Reason for Call: New Patient/New to Provider Appointment needed: New   Patient Request Appointment    QUESTIONS    Reason for appointment request? No appointments available during search     Additional Information for Provider? Patient needs a hospital follow up   and new patient appt. Her family goes here as well. Please advise.   ---------------------------------------------------------------------------  --------------  CALL BACK INFO  [phone]; OK to leave message on voicemail  ---------------------------------------------------------------------------  --------------  SCRIPT ANSWERS

## 2024-02-21 ENCOUNTER — HOSPITAL ENCOUNTER (OUTPATIENT)
Age: 89
Setting detail: SPECIMEN
Discharge: HOME | End: 2024-02-21
Payer: MEDICARE

## 2024-02-21 ENCOUNTER — INPATIENT HOSPITAL (OUTPATIENT)
Dept: URBAN - METROPOLITAN AREA HOSPITAL 56 | Facility: HOSPITAL | Age: 89
End: 2024-02-21
Payer: MEDICARE

## 2024-02-21 DIAGNOSIS — K57.33 DIVERTICULITIS OF LARGE INTESTINE WITHOUT PERFORATION OR ABS: ICD-10-CM

## 2024-02-21 DIAGNOSIS — D62 ACUTE POSTHEMORRHAGIC ANEMIA: ICD-10-CM

## 2024-02-21 DIAGNOSIS — D68.2 HEREDITARY DEFICIENCY OF OTHER CLOTTING FACTORS: ICD-10-CM

## 2024-02-21 LAB
BASOPHILS ABSOLUTE: 0.1 K/CU MM
BASOPHILS RELATIVE PERCENT: 1.3 % (ref 0–1)
DIFFERENTIAL TYPE: ABNORMAL
EOSINOPHILS ABSOLUTE: 0.2 K/CU MM
EOSINOPHILS RELATIVE PERCENT: 2.4 % (ref 0–3)
HCT VFR BLD CALC: 37.1 % (ref 37–47)
HEMOGLOBIN: 10.9 GM/DL (ref 12.5–16)
IMMATURE NEUTROPHIL %: 0.6 % (ref 0–0.43)
LYMPHOCYTES ABSOLUTE: 1.9 K/CU MM
LYMPHOCYTES RELATIVE PERCENT: 19.6 % (ref 24–44)
MCH RBC QN AUTO: 28.8 PG (ref 27–31)
MCHC RBC AUTO-ENTMCNC: 29.4 % (ref 32–36)
MCV RBC AUTO: 97.9 FL (ref 78–100)
MONOCYTES ABSOLUTE: 0.8 K/CU MM
MONOCYTES RELATIVE PERCENT: 8 % (ref 0–4)
NUCLEATED RBC %: 0 %
PDW BLD-RTO: 16.1 % (ref 11.7–14.9)
PLATELET # BLD: 320 K/CU MM (ref 140–440)
PMV BLD AUTO: 9.5 FL (ref 7.5–11.1)
RBC # BLD: 3.79 M/CU MM (ref 4.2–5.4)
SEGMENTED NEUTROPHILS ABSOLUTE COUNT: 6.5 K/CU MM
SEGMENTED NEUTROPHILS RELATIVE PERCENT: 68.1 % (ref 36–66)
TOTAL IMMATURE NEUTOROPHIL: 0.06 K/CU MM
TOTAL NUCLEATED RBC: 0 K/CU MM
WBC # BLD: 9.6 K/CU MM (ref 4–10.5)

## 2024-02-21 PROCEDURE — 99222 1ST HOSP IP/OBS MODERATE 55: CPT | Mod: FS | Performed by: PHYSICIAN ASSISTANT

## 2024-02-21 PROCEDURE — 85025 COMPLETE CBC W/AUTO DIFF WBC: CPT

## 2024-02-21 PROCEDURE — 36415 COLL VENOUS BLD VENIPUNCTURE: CPT

## 2024-02-22 PROCEDURE — 99232 SBSQ HOSP IP/OBS MODERATE 35: CPT | Mod: FS | Performed by: PHYSICIAN ASSISTANT

## 2024-02-23 PROCEDURE — 99232 SBSQ HOSP IP/OBS MODERATE 35: CPT | Mod: FS | Performed by: PHYSICIAN ASSISTANT

## 2024-05-10 ENCOUNTER — OFFICE VISIT (OUTPATIENT)
Dept: FAMILY MEDICINE CLINIC | Age: 89
End: 2024-05-10
Payer: MEDICARE

## 2024-05-10 VITALS
HEIGHT: 65 IN | BODY MASS INDEX: 27.92 KG/M2 | WEIGHT: 167.6 LBS | HEART RATE: 74 BPM | TEMPERATURE: 97.7 F | OXYGEN SATURATION: 98 % | DIASTOLIC BLOOD PRESSURE: 80 MMHG | SYSTOLIC BLOOD PRESSURE: 124 MMHG

## 2024-05-10 DIAGNOSIS — M81.0 AGE-RELATED OSTEOPOROSIS WITHOUT CURRENT PATHOLOGICAL FRACTURE: ICD-10-CM

## 2024-05-10 DIAGNOSIS — Z00.00 INITIAL MEDICARE ANNUAL WELLNESS VISIT: Primary | ICD-10-CM

## 2024-05-10 DIAGNOSIS — D68.51 FACTOR V LEIDEN MUTATION (HCC): ICD-10-CM

## 2024-05-10 DIAGNOSIS — F33.41 RECURRENT MAJOR DEPRESSIVE DISORDER, IN PARTIAL REMISSION (HCC): ICD-10-CM

## 2024-05-10 DIAGNOSIS — Z95.828 S/P IVC FILTER: ICD-10-CM

## 2024-05-10 DIAGNOSIS — Z86.718 HISTORY OF RECURRENT DEEP VEIN THROMBOSIS (DVT): ICD-10-CM

## 2024-05-10 DIAGNOSIS — Z91.81 AT RISK FOR FALLS: ICD-10-CM

## 2024-05-10 PROBLEM — M25.551 RIGHT HIP PAIN: Status: ACTIVE | Noted: 2022-12-08

## 2024-05-10 PROBLEM — I16.1 HYPERTENSIVE EMERGENCY: Status: RESOLVED | Noted: 2023-02-20 | Resolved: 2024-05-10

## 2024-05-10 PROBLEM — J30.0 VASOMOTOR RHINITIS: Status: ACTIVE | Noted: 2023-05-09

## 2024-05-10 PROBLEM — H91.93 HEARING DIFFICULTY OF BOTH EARS: Status: ACTIVE | Noted: 2023-05-09

## 2024-05-10 PROBLEM — K92.2 ACUTE GI BLEEDING: Status: ACTIVE | Noted: 2024-01-28

## 2024-05-10 PROBLEM — Z87.19 HISTORY OF GI BLEED: Status: ACTIVE | Noted: 2024-01-28

## 2024-05-10 PROBLEM — R79.89 ABNORMAL C-REACTIVE PROTEIN: Status: ACTIVE | Noted: 2022-12-08

## 2024-05-10 PROBLEM — Z79.01 CHRONIC ANTICOAGULATION: Status: ACTIVE | Noted: 2024-02-05

## 2024-05-10 PROBLEM — D50.0 IRON DEFICIENCY ANEMIA DUE TO CHRONIC BLOOD LOSS: Chronic | Status: ACTIVE | Noted: 2024-02-26

## 2024-05-10 PROBLEM — K90.9 IRON MALABSORPTION: Status: ACTIVE | Noted: 2024-02-26

## 2024-05-10 PROCEDURE — G0438 PPPS, INITIAL VISIT: HCPCS | Performed by: FAMILY MEDICINE

## 2024-05-10 PROCEDURE — 1123F ACP DISCUSS/DSCN MKR DOCD: CPT | Performed by: FAMILY MEDICINE

## 2024-05-10 RX ORDER — MULTIVIT WITH MINERALS/LUTEIN
250 TABLET ORAL DAILY
COMMUNITY

## 2024-05-10 RX ORDER — APIXABAN 2.5 MG/1
2.5 TABLET, FILM COATED ORAL 2 TIMES DAILY
COMMUNITY
End: 2024-05-10 | Stop reason: SDUPTHER

## 2024-05-10 RX ORDER — APIXABAN 2.5 MG/1
2.5 TABLET, FILM COATED ORAL 2 TIMES DAILY
Qty: 180 TABLET | Refills: 1 | Status: SHIPPED | OUTPATIENT
Start: 2024-05-10

## 2024-05-10 RX ORDER — FERROUS SULFATE 325(65) MG
325 TABLET ORAL
COMMUNITY
Start: 2024-02-26 | End: 2024-05-26

## 2024-05-10 RX ORDER — VENLAFAXINE 112.5 MG/1
1 TABLET, EXTENDED RELEASE ORAL DAILY
Qty: 90 TABLET | Refills: 1 | Status: SHIPPED | OUTPATIENT
Start: 2024-05-10

## 2024-05-10 RX ORDER — ALENDRONATE SODIUM 70 MG/1
TABLET ORAL
Qty: 12 TABLET | Refills: 3 | Status: SHIPPED | OUTPATIENT
Start: 2024-05-10

## 2024-05-10 SDOH — HEALTH STABILITY: PHYSICAL HEALTH: ON AVERAGE, HOW MANY DAYS PER WEEK DO YOU ENGAGE IN MODERATE TO STRENUOUS EXERCISE (LIKE A BRISK WALK)?: 0 DAYS

## 2024-05-10 SDOH — ECONOMIC STABILITY: FOOD INSECURITY: WITHIN THE PAST 12 MONTHS, YOU WORRIED THAT YOUR FOOD WOULD RUN OUT BEFORE YOU GOT MONEY TO BUY MORE.: NEVER TRUE

## 2024-05-10 SDOH — ECONOMIC STABILITY: HOUSING INSECURITY
IN THE LAST 12 MONTHS, WAS THERE A TIME WHEN YOU DID NOT HAVE A STEADY PLACE TO SLEEP OR SLEPT IN A SHELTER (INCLUDING NOW)?: NO

## 2024-05-10 SDOH — HEALTH STABILITY: PHYSICAL HEALTH: ON AVERAGE, HOW MANY MINUTES DO YOU ENGAGE IN EXERCISE AT THIS LEVEL?: 0 MIN

## 2024-05-10 SDOH — ECONOMIC STABILITY: INCOME INSECURITY: HOW HARD IS IT FOR YOU TO PAY FOR THE VERY BASICS LIKE FOOD, HOUSING, MEDICAL CARE, AND HEATING?: NOT HARD AT ALL

## 2024-05-10 SDOH — ECONOMIC STABILITY: FOOD INSECURITY: WITHIN THE PAST 12 MONTHS, THE FOOD YOU BOUGHT JUST DIDN'T LAST AND YOU DIDN'T HAVE MONEY TO GET MORE.: NEVER TRUE

## 2024-05-10 ASSESSMENT — PATIENT HEALTH QUESTIONNAIRE - PHQ9
1. LITTLE INTEREST OR PLEASURE IN DOING THINGS: NOT AT ALL
SUM OF ALL RESPONSES TO PHQ QUESTIONS 1-9: 0
SUM OF ALL RESPONSES TO PHQ9 QUESTIONS 1 & 2: 0
2. FEELING DOWN, DEPRESSED OR HOPELESS: NOT AT ALL
SUM OF ALL RESPONSES TO PHQ QUESTIONS 1-9: 0

## 2024-05-10 ASSESSMENT — LIFESTYLE VARIABLES
HOW OFTEN DO YOU HAVE A DRINK CONTAINING ALCOHOL: NEVER
HOW MANY STANDARD DRINKS CONTAINING ALCOHOL DO YOU HAVE ON A TYPICAL DAY: PATIENT DOES NOT DRINK

## 2024-05-10 ASSESSMENT — VISUAL ACUITY
OD_CC: 20/40
OS_CC: 20/30 -1

## 2024-05-10 ASSESSMENT — ENCOUNTER SYMPTOMS: VOICE CHANGE: 1

## 2024-05-10 NOTE — PROGRESS NOTES
5/10/24    Pippa Mcbride  6/7/1933    [place]    HPI Christian Das is a 89 y.o. female who presents today for evaluation of:  Chief Complaint   Patient presents with    New Patient     Establish care    Other     Several hospitalizations recently wants an MD not NP     Medicare AWV     Recurrenty rectal bleeds : has had several hospitalizations for rectal bleeding and she has had a lot of blood clots. Was on Xarelto. Now on Eliquis and seems to be doing better. On eliquis since 2/2024.    Review of Systems   HENT:  Positive for voice change.          Allergies   Allergen Reactions    Codeine Other (See Comments)    Vitamin K     Penicillin G Rash     Event:        Sulfa Antibiotics Rash     Event:          OBJECTIVE    /80 (Site: Left Upper Arm, Position: Sitting, Cuff Size: Medium Adult)   Pulse 74   Temp 97.7 °F (36.5 °C) (Infrared)   Ht 1.638 m (5' 4.5\")   Wt 76 kg (167 lb 9.6 oz)   SpO2 98%   BMI 28.32 kg/m²     Physical Exam   Constitutional:       General: Not in acute distress.     Appearance: Normal appearance. Not ill-appearing.   Eyes:      General: No scleral icterus.  Cardiovascular:      Rate and Rhythm: Normal rate and regular rhythm.      Heart sounds: No murmur heard.   No friction rub. No gallop.    Pulmonary:      Effort: Pulmonary effort is normal. No respiratory distress.      Breath sounds: No wheezing, rhonchi or rales.   Abdominal:      Palpations: Abdomen is soft. There is no mass.      Tenderness: There is no abdominal tenderness.   Musculoskeletal:     Moves all extremities normally.    Skin:     General: Skin is warm.      Coloration: Skin is not jaundiced.   Neurological:      Mental Status: Patient is alert.   Psychiatric:         Behavior: Behavior normal.         Thought Content: Thought content normal.         Judgment: Judgment normal.          ASSESSMENT/PLAN:    1. Initial Medicare annual wellness visit  2. Factor V Leiden mutation (HCC)  Assessment & Plan:

## 2024-05-10 NOTE — PATIENT INSTRUCTIONS
Care instructions adapted under license by [employer]. If you have questions about a medical condition or this instruction, always ask your healthcare professional. [employer], [employer] disclaims any warranty or liability for your use of this information.      Personalized Preventive Plan for Pippa Mcbride - 5/10/2024  Medicare offers a range of preventive health benefits. Some of the tests and screenings are paid in full while other may be subject to a deductible, co-insurance, and/or copay.    Some of these benefits include a comprehensive review of your medical history including lifestyle, illnesses that may run in your family, and various assessments and screenings as appropriate.    After reviewing your medical record and screening and assessments performed today your provider may have ordered immunizations, labs, imaging, and/or referrals for you.  A list of these orders (if applicable) as well as your Preventive Care list are included within your After Visit Summary for your review.    Other Preventive Recommendations:    A preventive eye exam performed by an eye specialist is recommended every 1-2 years to screen for glaucoma; cataracts, macular degeneration, and other eye disorders.  A preventive dental visit is recommended every 6 months.  Try to get at least 150 minutes of exercise per week or 10,000 steps per day on a pedometer .  Order or download the FREE \"Exercise & Physical Activity: Your Everyday Guide\" from [employer]. Call 1-949.358.1705 or search [employer] online.  You need 9408-0851 mg of calcium and 4934-4271 IU of vitamin D per day. It is possible to meet your calcium requirement with diet alone, but a vitamin D supplement is usually necessary to meet this goal.  When exposed to the sun, use a sunscreen that protects against both UVA and UVB radiation with an SPF of 30 or greater. Reapply every 2 to 3 hours or after sweating, drying off

## 2024-05-10 NOTE — ASSESSMENT & PLAN NOTE
She is at risk of falls but does not want to do physical therapy.  Recommended leg exercises or dance or other things such as balance exercise.  She is not particularly interested in any of these and at age 89 she is entitled to choose how much she wants to do to improve her health and longevity.

## 2024-06-17 ENCOUNTER — OFFICE (OUTPATIENT)
Dept: URBAN - METROPOLITAN AREA CLINIC 18 | Facility: CLINIC | Age: 89
End: 2024-06-17

## 2024-06-17 VITALS
SYSTOLIC BLOOD PRESSURE: 136 MMHG | DIASTOLIC BLOOD PRESSURE: 76 MMHG | HEIGHT: 64 IN | WEIGHT: 170 LBS | HEART RATE: 89 BPM

## 2024-06-17 DIAGNOSIS — K62.5 HEMORRHAGE OF ANUS AND RECTUM: ICD-10-CM

## 2024-06-17 DIAGNOSIS — K57.90 DIVERTICULOSIS OF INTESTINE, PART UNSPECIFIED, WITHOUT PERFO: ICD-10-CM

## 2024-06-17 DIAGNOSIS — Z86.010 PERSONAL HISTORY OF COLONIC POLYPS: ICD-10-CM

## 2024-06-17 PROCEDURE — 99213 OFFICE O/P EST LOW 20 MIN: CPT | Performed by: INTERNAL MEDICINE

## 2024-06-17 NOTE — SERVICEHPINOTES
Keri Bernal   is seen today for a follow-up visit.     The patient, with a history of factor V Leiden deficiency, protein C deficiency, and extensive diverticular disease, presents with intermittent rectal bleeding. She reports a significant episode of gastrointestinal bleeding in January, requiring multiple blood transfusions and ICU admission. Despite extensive investigations, including colonoscopy and angiogram, the source of the bleeding was not definitively identified, though it was presumed to be due to diverticulosis.Following this episode, the patient was transitioned from Xarelto to Eliquis for her clotting disorder. However, within days of discontinuing Xarelto, she developed a large deep vein thrombosis (DVT) and pulmonary embolism (PE), necessitating hospital readmission.Over the past week or two, the patient has noticed occasional spotting of bright red blood on the toilet paper, though not with every bowel movement. This has caused concern due to her previous significant bleed.The patient has a history of hemorrhoid surgery and reports no current rectal pain or discomfort. She expresses a strong desire to avoid a repeat of her previous severe bleeding episode.The patient also has a history of multiple blood clots, particularly in the left leg, which began following ankle surgery many years ago. She has a filter in place due to her high risk of PE.
br
brColonoscopy on 1/31/2024 showed diverticulosis.

## 2024-07-18 ENCOUNTER — OFFICE VISIT (OUTPATIENT)
Dept: FAMILY MEDICINE CLINIC | Age: 89
End: 2024-07-18
Payer: MEDICARE

## 2024-07-18 VITALS
OXYGEN SATURATION: 95 % | WEIGHT: 165.2 LBS | DIASTOLIC BLOOD PRESSURE: 60 MMHG | SYSTOLIC BLOOD PRESSURE: 118 MMHG | HEART RATE: 87 BPM | BODY MASS INDEX: 27.92 KG/M2 | TEMPERATURE: 98.6 F

## 2024-07-18 DIAGNOSIS — J01.10 ACUTE NON-RECURRENT FRONTAL SINUSITIS: Primary | ICD-10-CM

## 2024-07-18 PROCEDURE — 1123F ACP DISCUSS/DSCN MKR DOCD: CPT | Performed by: FAMILY MEDICINE

## 2024-07-18 PROCEDURE — 99213 OFFICE O/P EST LOW 20 MIN: CPT | Performed by: FAMILY MEDICINE

## 2024-07-18 RX ORDER — AZITHROMYCIN 250 MG/1
TABLET, FILM COATED ORAL
Qty: 6 TABLET | Refills: 0 | Status: SHIPPED | OUTPATIENT
Start: 2024-07-18 | End: 2024-07-28

## 2024-07-18 ASSESSMENT — ENCOUNTER SYMPTOMS
DIARRHEA: 0
VOMITING: 0
SHORTNESS OF BREATH: 1
COUGH: 1
NAUSEA: 0
SORE THROAT: 1
WHEEZING: 0
CONSTIPATION: 0

## 2024-07-18 NOTE — PROGRESS NOTES
7/18/24    Pippa Mcbride  6/7/1933    SUBJECTIVE    HPI - Pippa is a 89 y.o. female who presents today for evaluation of:  Chief Complaint   Patient presents with    Congestion     Onset Tuesday     Cough     No drainage    Sick : 3 days.   Got sick from sick grandchild. Has some pain in back. Cough is not productive.    Review of Systems   Constitutional:  Negative for chills and fever.   HENT:  Positive for congestion and sore throat. Negative for ear pain.    Respiratory:  Positive for cough and shortness of breath. Negative for wheezing.    Gastrointestinal:  Negative for constipation, diarrhea, nausea and vomiting.   Genitourinary:  Negative for dysuria and frequency.         Allergies   Allergen Reactions    Codeine Other (See Comments)    Vitamin K     Penicillin G Rash     Event:        Sulfa Antibiotics Rash     Event:          OBJECTIVE    /60 (Site: Left Upper Arm, Position: Sitting, Cuff Size: Large Adult)   Pulse 87   Temp 98.6 °F (37 °C) (Infrared)   Wt 74.9 kg (165 lb 3.2 oz)   SpO2 95%   BMI 27.92 kg/m²     Physical Exam   Constitutional:       General: Not in acute distress.     Appearance: Normal appearance. Not ill-appearing.  She is here with her daughter.  Eyes:      General: No scleral icterus.  HENT:      Head: Normocephalic.      Right Ear: Tympanic membrane, ear canal and external ear normal.      Left Ear: Tympanic membrane, ear canal and external ear normal.      Nose: Nose normal.      Bilateral frontal and maxillary sinus tenderness.      Mouth/Throat:      Mouth: Mucous membranes are moist.      Pharynx: No oropharyngeal exudate, posterior oropharyngeal erythema or uvula swelling.      Tonsils: No tonsillar exudate or tonsillar abscesses.  Cardiovascular:      Rate and Rhythm: Normal rate and regular rhythm.      Heart sounds: No murmur heard.   No friction rub. No gallop.    Pulmonary:      Effort: Pulmonary effort is normal. No respiratory distress.      Breath

## 2024-11-20 DIAGNOSIS — D68.51 FACTOR V LEIDEN MUTATION (HCC): ICD-10-CM

## 2024-11-20 DIAGNOSIS — Z86.718 HISTORY OF RECURRENT DEEP VEIN THROMBOSIS (DVT): ICD-10-CM

## 2024-11-20 RX ORDER — APIXABAN 2.5 MG/1
2.5 TABLET, FILM COATED ORAL 2 TIMES DAILY
Qty: 180 TABLET | Refills: 1 | Status: SHIPPED | OUTPATIENT
Start: 2024-11-20

## 2024-12-05 DIAGNOSIS — F33.41 RECURRENT MAJOR DEPRESSIVE DISORDER, IN PARTIAL REMISSION (HCC): ICD-10-CM

## 2024-12-05 RX ORDER — VENLAFAXINE 112.5 MG/1
1 TABLET, EXTENDED RELEASE ORAL DAILY
Qty: 90 TABLET | Refills: 3 | OUTPATIENT
Start: 2024-12-05

## 2025-01-10 ENCOUNTER — TELEPHONE (OUTPATIENT)
Dept: FAMILY MEDICINE CLINIC | Age: 89
End: 2025-01-10

## 2025-01-10 NOTE — TELEPHONE ENCOUNTER
Got a call from [facility] [place] that Pippa had fallen about 1 AM this morning.  She denied hitting her head.  She did not feel any need to go to the ER.  She is taking Eliquis as a blood thinner.  She manages her own medicines.  There and felt that there is no reason to believe that she lacks mental ability to make her own decisions.  I asked if they could make sure that they check on her mid morning and late morning.

## 2025-01-13 ENCOUNTER — OFFICE VISIT (OUTPATIENT)
Dept: FAMILY MEDICINE CLINIC | Age: 89
End: 2025-01-13
Payer: MEDICARE

## 2025-01-13 ENCOUNTER — HOSPITAL ENCOUNTER (EMERGENCY)
Age: 89
Discharge: HOME | End: 2025-01-13
Attending: EMERGENCY MEDICINE
Payer: MEDICARE

## 2025-01-13 VITALS
DIASTOLIC BLOOD PRESSURE: 78 MMHG | BODY MASS INDEX: 27.48 KG/M2 | WEIGHT: 162.6 LBS | SYSTOLIC BLOOD PRESSURE: 120 MMHG | HEART RATE: 93 BPM | OXYGEN SATURATION: 97 % | TEMPERATURE: 98.2 F

## 2025-01-13 VITALS
TEMPERATURE: 98.8 F | SYSTOLIC BLOOD PRESSURE: 185 MMHG | OXYGEN SATURATION: 95 % | BODY MASS INDEX: 27.83 KG/M2 | HEART RATE: 90 BPM | WEIGHT: 163 LBS | RESPIRATION RATE: 18 BRPM | HEIGHT: 64 IN | DIASTOLIC BLOOD PRESSURE: 90 MMHG

## 2025-01-13 DIAGNOSIS — J01.90 ACUTE BACTERIAL SINUSITIS: ICD-10-CM

## 2025-01-13 DIAGNOSIS — D64.9 ANEMIA, UNSPECIFIED TYPE: ICD-10-CM

## 2025-01-13 DIAGNOSIS — I82.509 MULTIPLE EPISODES OF DEEP VENOUS THROMBOSIS (HCC): ICD-10-CM

## 2025-01-13 DIAGNOSIS — F33.41 RECURRENT MAJOR DEPRESSIVE DISORDER, IN PARTIAL REMISSION (HCC): ICD-10-CM

## 2025-01-13 DIAGNOSIS — E53.8 B12 DEFICIENCY: ICD-10-CM

## 2025-01-13 DIAGNOSIS — Z00.01 ENCOUNTER FOR WELL ADULT EXAM WITH ABNORMAL FINDINGS: Primary | ICD-10-CM

## 2025-01-13 DIAGNOSIS — R49.9 CHANGE IN VOICE: ICD-10-CM

## 2025-01-13 DIAGNOSIS — R29.898 LEFT LEG WEAKNESS: ICD-10-CM

## 2025-01-13 DIAGNOSIS — L03.114 CELLULITIS OF LEFT UPPER EXTREMITY: ICD-10-CM

## 2025-01-13 DIAGNOSIS — K90.9 IRON MALABSORPTION: ICD-10-CM

## 2025-01-13 DIAGNOSIS — J30.89 NON-SEASONAL ALLERGIC RHINITIS, UNSPECIFIED TRIGGER: ICD-10-CM

## 2025-01-13 DIAGNOSIS — Z91.81 AT RISK FOR FALLS: ICD-10-CM

## 2025-01-13 DIAGNOSIS — M81.0 AGE-RELATED OSTEOPOROSIS WITHOUT CURRENT PATHOLOGICAL FRACTURE: ICD-10-CM
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
## 2025-01-13 DIAGNOSIS — B96.89 ACUTE BACTERIAL SINUSITIS: ICD-10-CM

## 2025-01-13 DIAGNOSIS — R53.83 FATIGUE, UNSPECIFIED TYPE: ICD-10-CM

## 2025-01-13 PROCEDURE — 99283 EMERGENCY DEPT VISIT LOW MDM: CPT

## 2025-01-13 PROCEDURE — 6370000000 HC RX 637 (ALT 250 FOR IP): Performed by: EMERGENCY MEDICINE

## 2025-01-13 PROCEDURE — 99213 OFFICE O/P EST LOW 20 MIN: CPT | Performed by: FAMILY MEDICINE

## 2025-01-13 RX ORDER — LORATADINE 10 MG/1
10 TABLET ORAL DAILY
Qty: 90 TABLET | Refills: 1 | Status: SHIPPED | OUTPATIENT
Start: 2025-01-13

## 2025-01-13 RX ORDER — DOXYCYCLINE HYCLATE 100 MG
100 TABLET ORAL 2 TIMES DAILY
Qty: 14 TABLET | Refills: 0 | Status: SHIPPED | OUTPATIENT
Start: 2025-01-13 | End: 2025-01-20

## 2025-01-13 RX ORDER — CEFDINIR 300 MG/1
300 CAPSULE ORAL 2 TIMES DAILY
Qty: 14 CAPSULE | Refills: 0 | Status: SHIPPED | OUTPATIENT
Start: 2025-01-13 | End: 2025-01-20

## 2025-01-13 RX ORDER — FLUTICASONE PROPIONATE 50 MCG
2 SPRAY, SUSPENSION (ML) NASAL DAILY
Qty: 16 G | Refills: 0 | Status: SHIPPED | OUTPATIENT
Start: 2025-01-13 | End: 2025-01-13

## 2025-01-13 RX ORDER — LORATADINE 10 MG/1
10 TABLET ORAL DAILY
Qty: 30 TABLET | Refills: 0 | Status: SHIPPED | OUTPATIENT
Start: 2025-01-13 | End: 2025-01-13

## 2025-01-13 RX ORDER — CEFDINIR 300 MG/1
300 CAPSULE ORAL 2 TIMES DAILY
Qty: 14 CAPSULE | Refills: 0 | Status: SHIPPED | OUTPATIENT
Start: 2025-01-13 | End: 2025-01-13

## 2025-01-13 RX ORDER — METRONIDAZOLE 250 MG/1
500 TABLET ORAL ONCE
Status: COMPLETED | OUTPATIENT
Start: 2025-01-13 | End: 2025-01-13

## 2025-01-13 RX ORDER — AZITHROMYCIN 250 MG/1
250 TABLET, FILM COATED ORAL DAILY
Qty: 4 TABLET | Refills: 0 | Status: SHIPPED | OUTPATIENT
Start: 2025-01-13 | End: 2025-01-17

## 2025-01-13 RX ORDER — AZITHROMYCIN 250 MG/1
500 TABLET, FILM COATED ORAL ONCE
Status: COMPLETED | OUTPATIENT
Start: 2025-01-13 | End: 2025-01-13

## 2025-01-13 RX ORDER — FLUTICASONE PROPIONATE 50 MCG
2 SPRAY, SUSPENSION (ML) NASAL DAILY
Qty: 16 G | Refills: 0 | Status: SHIPPED | OUTPATIENT
Start: 2025-01-13

## 2025-01-13 RX ORDER — VENLAFAXINE 112.5 MG/1
1 TABLET, EXTENDED RELEASE ORAL DAILY
Qty: 90 TABLET | Refills: 1 | Status: SHIPPED | OUTPATIENT
Start: 2025-01-13

## 2025-01-13 RX ORDER — DOXYCYCLINE HYCLATE 100 MG
100 TABLET ORAL ONCE
Status: COMPLETED | OUTPATIENT
Start: 2025-01-13 | End: 2025-01-13

## 2025-01-13 RX ORDER — METRONIDAZOLE 500 MG/1
500 TABLET ORAL 3 TIMES DAILY
Qty: 21 TABLET | Refills: 0 | Status: SHIPPED | OUTPATIENT
Start: 2025-01-13 | End: 2025-01-20

## 2025-01-13 RX ADMIN — AZITHROMYCIN DIHYDRATE 500 MG: 250 TABLET ORAL at 21:15

## 2025-01-13 RX ADMIN — METRONIDAZOLE 500 MG: 250 TABLET ORAL at 21:15

## 2025-01-13 RX ADMIN — DOXYCYCLINE HYCLATE 100 MG: 100 TABLET, COATED ORAL at 21:15

## 2025-01-13 SDOH — ECONOMIC STABILITY: FOOD INSECURITY: WITHIN THE PAST 12 MONTHS, YOU WORRIED THAT YOUR FOOD WOULD RUN OUT BEFORE YOU GOT MONEY TO BUY MORE.: NEVER TRUE

## 2025-01-13 SDOH — ECONOMIC STABILITY: FOOD INSECURITY: WITHIN THE PAST 12 MONTHS, THE FOOD YOU BOUGHT JUST DIDN'T LAST AND YOU DIDN'T HAVE MONEY TO GET MORE.: NEVER TRUE

## 2025-01-13 ASSESSMENT — ENCOUNTER SYMPTOMS
VOICE CHANGE: 1
SHORTNESS OF BREATH: 0
BLOOD IN STOOL: 1
BACK PAIN: 0
COUGH: 1
ABDOMINAL PAIN: 0
DIARRHEA: 0
TROUBLE SWALLOWING: 0
CONSTIPATION: 1
RHINORRHEA: 1
NAUSEA: 0
APNEA: 0
VOMITING: 0
EYE PAIN: 0

## 2025-01-13 ASSESSMENT — PATIENT HEALTH QUESTIONNAIRE - PHQ9
SUM OF ALL RESPONSES TO PHQ QUESTIONS 1-9: 0
10. IF YOU CHECKED OFF ANY PROBLEMS, HOW DIFFICULT HAVE THESE PROBLEMS MADE IT FOR YOU TO DO YOUR WORK, TAKE CARE OF THINGS AT HOME, OR GET ALONG WITH OTHER PEOPLE: NOT DIFFICULT AT ALL
7. TROUBLE CONCENTRATING ON THINGS, SUCH AS READING THE NEWSPAPER OR WATCHING TELEVISION: NOT AT ALL
3. TROUBLE FALLING OR STAYING ASLEEP: NOT AT ALL
SUM OF ALL RESPONSES TO PHQ QUESTIONS 1-9: 0
4. FEELING TIRED OR HAVING LITTLE ENERGY: NOT AT ALL
SUM OF ALL RESPONSES TO PHQ9 QUESTIONS 1 & 2: 0
2. FEELING DOWN, DEPRESSED OR HOPELESS: NOT AT ALL
SUM OF ALL RESPONSES TO PHQ QUESTIONS 1-9: 0
5. POOR APPETITE OR OVEREATING: NOT AT ALL
8. MOVING OR SPEAKING SO SLOWLY THAT OTHER PEOPLE COULD HAVE NOTICED. OR THE OPPOSITE, BEING SO FIGETY OR RESTLESS THAT YOU HAVE BEEN MOVING AROUND A LOT MORE THAN USUAL: NOT AT ALL
6. FEELING BAD ABOUT YOURSELF - OR THAT YOU ARE A FAILURE OR HAVE LET YOURSELF OR YOUR FAMILY DOWN: NOT AT ALL
SUM OF ALL RESPONSES TO PHQ QUESTIONS 1-9: 0
1. LITTLE INTEREST OR PLEASURE IN DOING THINGS: NOT AT ALL
9. THOUGHTS THAT YOU WOULD BE BETTER OFF DEAD, OR OF HURTING YOURSELF: NOT AT ALL

## 2025-01-13 ASSESSMENT — PAIN DESCRIPTION - FREQUENCY: FREQUENCY: CONTINUOUS

## 2025-01-13 ASSESSMENT — PAIN DESCRIPTION - PAIN TYPE: TYPE: ACUTE PAIN

## 2025-01-13 ASSESSMENT — PAIN DESCRIPTION - ORIENTATION: ORIENTATION: LEFT;LOWER

## 2025-01-13 ASSESSMENT — PAIN DESCRIPTION - ONSET: ONSET: PROGRESSIVE

## 2025-01-13 ASSESSMENT — PAIN SCALES - GENERAL: PAINLEVEL_OUTOF10: 7

## 2025-01-13 ASSESSMENT — PAIN DESCRIPTION - LOCATION: LOCATION: ARM

## 2025-01-13 NOTE — ASSESSMENT & PLAN NOTE
Her depression is currently in partial remission. She will continue her current regimen of venlafaxine 112.5 mg by mouth daily. A refill for venlafaxine will be provided.

## 2025-01-13 NOTE — PATIENT INSTRUCTIONS
your skin from too much sun, wash your hands, brush your teeth twice a day, and wear a seat belt in the car.   Where can you learn more?  Go to [email or web address] and enter K859 to learn more about \"Well Visit, Over 65: Care Instructions.\"  Current as of: April 30, 2024  Content Version: 14.3  © 2024 [employer].   Care instructions adapted under license by [id number]. If you have questions about a medical condition or this instruction, always ask your healthcare professional. [employer], disclaims any warranty or liability for your use of this information.

## 2025-01-13 NOTE — PROGRESS NOTES
experiences coughing at night but reports no chest pain, palpitations, abdominal pain, diarrhea, nausea, vomiting, hematuria, dysuria, vaginal bleeding, discharge, pain, joint pain, back pain, neck pain, skin wounds, sores, urinary frequency, cold intolerance, swollen glands, easy bleeding, bruising, headaches, numbness, seizures, speech difficulties, syncope, trouble concentrating, depressed mood, anxiety, sleep disturbances, or suicidal ideation. She reports some constipation and occasional hematochezia. She had a partial colectomy and underwent a colonoscopy last year due to bleeding. She consumes yogurt twice daily, which appears to manage her symptoms.    She has a history of anemia and has previously taken iron supplements during a hospital stay.    She has a history of vitamin B12 deficiency and iron malabsorption.    She is currently on Eliquis 2.5 mg twice daily, calcium with vitamin D, and alendronate once weekly. She maintains a healthy diet, sleeps well, has good social support, and reports minimal stress.    ALLERGIES  The patient is allergic to PENICILLINS.    MEDICATIONS  venlafaxine, multivitamins, Eliquis, calcium with vitamin D, alendronate      Allergies   Allergen Reactions    Codeine Other (See Comments)    Vitamin K     Penicillin G Rash     Event:        Sulfa Antibiotics Rash     Event:          OBJECTIVE    /78 (Site: Left Upper Arm, Position: Sitting, Cuff Size: Medium Adult)   Pulse 93   Temp 98.2 °F (36.8 °C) (Infrared)   Wt 73.8 kg (162 lb 9.6 oz)   SpO2 97%   BMI 27.48 kg/m²     Physical exam:  See also the complete physical exam note below  HENT:      Head: Normocephalic.      Right Ear: Tympanic membrane, ear canal and external ear normal.      Left Ear: Tympanic membrane, ear canal and external ear normal.      Nose: Nose normal.     +bilateral sinus tenderness.      Mouth/Throat:      Mouth: Mucous membranes are moist.      Pharynx: No oropharyngeal exudate, posterior

## 2025-01-13 NOTE — ASSESSMENT & PLAN NOTE
She will continue taking alendronate once a week. She is encouraged to perform leg strengthening exercises, as they may be more beneficial than the medication alone.   Name band;

## 2025-01-14 LAB
ALBUMIN SERPL-MCNC: 4 G/DL (ref 3.4–5)
ALBUMIN/GLOB SERPL: 1.7 {RATIO} (ref 1.1–2.2)
ALP SERPL-CCNC: 74 U/L (ref 40–129)
ALT SERPL-CCNC: 15 U/L (ref 10–40)
ANION GAP SERPL CALCULATED.3IONS-SCNC: 14 MMOL/L (ref 3–16)
AST SERPL-CCNC: 25 U/L (ref 15–37)
BASOPHILS # BLD: 0.1 K/UL (ref 0–0.2)
BASOPHILS NFR BLD: 1.3 %
BILIRUB SERPL-MCNC: 0.3 MG/DL (ref 0–1)
BUN SERPL-MCNC: 14 MG/DL (ref 7–20)
CALCIUM SERPL-MCNC: 10 MG/DL (ref 8.3–10.6)
CHLORIDE SERPL-SCNC: 105 MMOL/L (ref 99–110)
CO2 SERPL-SCNC: 21 MMOL/L (ref 21–32)
CREAT SERPL-MCNC: 0.8 MG/DL (ref 0.6–1.2)
DEPRECATED RDW RBC AUTO: 13.8 % (ref 12.4–15.4)
EOSINOPHIL # BLD: 0.3 K/UL (ref 0–0.6)
EOSINOPHIL NFR BLD: 4.7 %
FOLATE SERPL-MCNC: 24.2 NG/ML (ref 4.78–24.2)
GFR SERPLBLD CREATININE-BSD FMLA CKD-EPI: 69 ML/MIN/{1.73_M2}
GLUCOSE SERPL-MCNC: 119 MG/DL (ref 70–99)
HCT VFR BLD AUTO: 41.2 % (ref 36–48)
HGB BLD-MCNC: 13.6 G/DL (ref 12–16)
IRON SATN MFR SERPL: 30 % (ref 15–50)
IRON SERPL-MCNC: 83 UG/DL (ref 37–145)
LYMPHOCYTES # BLD: 1.6 K/UL (ref 1–5.1)
LYMPHOCYTES NFR BLD: 22.1 %
MCH RBC QN AUTO: 32.2 PG (ref 26–34)
MCHC RBC AUTO-ENTMCNC: 33 G/DL (ref 31–36)
MCV RBC AUTO: 97.7 FL (ref 80–100)
MONOCYTES # BLD: 0.8 K/UL (ref 0–1.3)
MONOCYTES NFR BLD: 10.9 %
NEUTROPHILS # BLD: 4.4 K/UL (ref 1.7–7.7)
NEUTROPHILS NFR BLD: 61 %
PLATELET # BLD AUTO: 205 K/UL (ref 135–450)
PMV BLD AUTO: 9.4 FL (ref 5–10.5)
POTASSIUM SERPL-SCNC: 4.2 MMOL/L (ref 3.5–5.1)
PROT SERPL-MCNC: 6.3 G/DL (ref 6.4–8.2)
RBC # BLD AUTO: 4.22 M/UL (ref 4–5.2)
SODIUM SERPL-SCNC: 140 MMOL/L (ref 136–145)
T4 FREE SERPL-MCNC: 1 NG/DL (ref 0.9–1.8)
TIBC SERPL-MCNC: 277 UG/DL (ref 260–445)
TSH SERPL DL<=0.005 MIU/L-ACNC: 5.96 UIU/ML (ref 0.27–4.2)
VIT B12 SERPL-MCNC: 364 PG/ML (ref 211–911)
WBC # BLD AUTO: 7.3 K/UL (ref 4–11)

## 2025-01-14 NOTE — ED PROVIDER NOTES
Triage Chief Complaint:   Arm Pain (Pt has redness and swelling to Lt lower forearm from a possible cat bite or scratch from last night )    [place]:  Pippa Mcbride is a 89 y.o. female that presents with a possible cat bite with rash to her left forearm.  Patient reports that she was bit by her cat or potentially scratched by her cat yesterday.  Area seem to be okay throughout the day today until this evening and now there is associated redness around it prompting ED visit.  Of note, patient was at her PCP office today and started on cefdinir for possible sinusitis.  Patient has had a sinus congestion for approximately 1 year.  Patient has not yet started this antibiotic.  No fevers.  No more proximal arm pains.    Tetanus is up-to-date.    ROS:  General:  No fevers, no chills  Respiratory:  No shortness of breath  Neurologic:  No numbness, no weakness  Extremities:  No edema, + pain  Skin:  + rash  Psych: No axienty    Past Medical History:   Diagnosis Date    Anticoagulated     B12 deficiency     Depression     Diverticulosis     GERD (gastroesophageal reflux disease)     Hearing loss     History of esophageal reflux     History of pulmonary embolus (PE)     History of recurrent deep vein thrombosis (DVT)     HTN (hypertension)     IBS (irritable bowel syndrome)     OA (osteoarthritis) of hip      Past Surgical History:   Procedure Laterality Date    ANKLE FRACTURE SURGERY  1976    APPENDECTOMY Bilateral 06/16/2009    CATARACT REMOVAL WITH IMPLANT Bilateral 12/21/2009    CORNEAL TRANSPLANT      12/21/2009    CYSTOCELE REPAIR  06/16/2009    HEMORRHOID SURGERY      HYSTERECTOMY, TOTAL ABDOMINAL (CERVIX REMOVED)      RECTOCELE REPAIR  06/16/2009    SIGMOID COLECTOMY  06/16/2009    SMALL INTESTINE SURGERY  06/16/2009    JUANJOSE AND BSO (CERVIX REMOVED)  06/16/2009     History reviewed. No pertinent family history.  Social History     Socioeconomic History    Marital status:      Spouse name: Not on file    Number

## 2025-02-28 ENCOUNTER — OFFICE VISIT (OUTPATIENT)
Dept: FAMILY MEDICINE CLINIC | Age: 89
End: 2025-02-28
Payer: MEDICARE

## 2025-02-28 VITALS
BODY MASS INDEX: 27.29 KG/M2 | OXYGEN SATURATION: 97 % | DIASTOLIC BLOOD PRESSURE: 70 MMHG | HEART RATE: 117 BPM | SYSTOLIC BLOOD PRESSURE: 116 MMHG | WEIGHT: 159 LBS

## 2025-02-28 DIAGNOSIS — J30.89 NON-SEASONAL ALLERGIC RHINITIS, UNSPECIFIED TRIGGER: ICD-10-CM

## 2025-02-28 DIAGNOSIS — L03.114 CELLULITIS OF LEFT UPPER EXTREMITY: Primary | ICD-10-CM

## 2025-02-28 PROCEDURE — 1123F ACP DISCUSS/DSCN MKR DOCD: CPT | Performed by: FAMILY MEDICINE

## 2025-02-28 PROCEDURE — 99213 OFFICE O/P EST LOW 20 MIN: CPT | Performed by: FAMILY MEDICINE

## 2025-02-28 PROCEDURE — 1159F MED LIST DOCD IN RCRD: CPT | Performed by: FAMILY MEDICINE

## 2025-02-28 RX ORDER — METRONIDAZOLE 500 MG/1
500 TABLET ORAL 3 TIMES DAILY
Qty: 21 TABLET | Refills: 0 | Status: SHIPPED | OUTPATIENT
Start: 2025-02-28 | End: 2025-03-07

## 2025-02-28 RX ORDER — MUPIROCIN 20 MG/G
OINTMENT TOPICAL
Qty: 45 EACH | Refills: 0 | Status: SHIPPED | OUTPATIENT
Start: 2025-02-28 | End: 2025-03-07

## 2025-02-28 RX ORDER — DOXYCYCLINE HYCLATE 100 MG
100 TABLET ORAL 2 TIMES DAILY
Qty: 14 TABLET | Refills: 0 | Status: SHIPPED | OUTPATIENT
Start: 2025-02-28 | End: 2025-03-07

## 2025-02-28 NOTE — PROGRESS NOTES
regular rhythm.      Heart sounds: No murmur heard.   No friction rub. No gallop.  Normal left radial pulse  Lymphatic: She has no left axillary lymphadenopathy.  Pulmonary:      Effort: Pulmonary effort is normal. No respiratory distress.      Breath sounds: No wheezing, rhonchi or rales.   Abdominal:      Palpations: Abdomen is soft. There is no mass.      Tenderness: There is no abdominal tenderness.   Musculoskeletal:     Moves all extremities normally.  Normal flexion and extension of the fingers of the left hand.  Skin:     General: Skin is warm.      Coloration: Skin is not jaundiced.  There is warmth and redness and swelling of the distal half of the left dorsal forearm.  She also reports some discomfort in the medial left biceps area but there is not redness there.  However there is a streak of redness over the left medial epicondyles.  She she has normal range of motion of the left elbow and wrist and shoulder.  Neurological:      Mental Status: Patient is alert.  Normal light touch sensation left palm left dorsal hand, and left little finger.  Psychiatric:         She seems to have some trouble with memory and allows her daughter to answer questions.      Results          ASSESSMENT/PLAN:    1. Cellulitis of left upper extremity  -     doxycycline hyclate (VIBRA-TABS) 100 MG tablet; Take 1 tablet by mouth 2 times daily for 7 days, Disp-14 tablet, R-0Normal  -     metroNIDAZOLE (FLAGYL) 500 MG tablet; Take 1 tablet by mouth 3 times daily for 7 days, Disp-21 tablet, R-0Normal  -     mupirocin (BACTROBAN) 2 % ointment; Apply topically 3 times daily., Disp-45 each, R-0, Normal  2. Allergic rhinitis, Non-seasonal \, unspecified trigger       Assessment & Plan  1. Cellulitis of the left upper extremity.  The patient has had redness and warmth in the left arm for about a month, which initially improved with antibiotics but has recurred. The pulse in the affected area is good, and there is tenderness upon

## 2025-03-07 ENCOUNTER — OFFICE VISIT (OUTPATIENT)
Dept: FAMILY MEDICINE CLINIC | Age: 89
End: 2025-03-07
Payer: MEDICARE

## 2025-03-07 VITALS
HEART RATE: 94 BPM | BODY MASS INDEX: 27.33 KG/M2 | DIASTOLIC BLOOD PRESSURE: 58 MMHG | SYSTOLIC BLOOD PRESSURE: 100 MMHG | TEMPERATURE: 97.3 F | WEIGHT: 159.2 LBS | OXYGEN SATURATION: 98 %

## 2025-03-07 DIAGNOSIS — R42 DIZZINESS: ICD-10-CM

## 2025-03-07 DIAGNOSIS — R19.7 DIARRHEA, UNSPECIFIED TYPE: ICD-10-CM

## 2025-03-07 DIAGNOSIS — L03.114 CELLULITIS OF LEFT UPPER EXTREMITY: ICD-10-CM

## 2025-03-07 DIAGNOSIS — I95.1 ORTHOSTATIC HYPOTENSION: Primary | ICD-10-CM

## 2025-03-07 DIAGNOSIS — R82.90 ABNORMAL URINALYSIS: ICD-10-CM

## 2025-03-07 LAB
BILIRUBIN, POC: NEGATIVE
BLOOD URINE, POC: NEGATIVE
CLARITY, POC: ABNORMAL
COLOR, POC: YELLOW
GLUCOSE URINE, POC: NEGATIVE MG/DL
KETONES, POC: NEGATIVE MG/DL
LEUKOCYTE EST, POC: ABNORMAL
NITRITE, POC: NEGATIVE
PH, POC: 6
PROTEIN, POC: NEGATIVE MG/DL
SPECIFIC GRAVITY, POC: 1.02
UROBILINOGEN, POC: NEGATIVE MG/DL

## 2025-03-07 PROCEDURE — 81002 URINALYSIS NONAUTO W/O SCOPE: CPT | Performed by: FAMILY MEDICINE

## 2025-03-07 PROCEDURE — 1123F ACP DISCUSS/DSCN MKR DOCD: CPT | Performed by: FAMILY MEDICINE

## 2025-03-07 PROCEDURE — 99213 OFFICE O/P EST LOW 20 MIN: CPT | Performed by: FAMILY MEDICINE

## 2025-03-07 PROCEDURE — 1159F MED LIST DOCD IN RCRD: CPT | Performed by: FAMILY MEDICINE

## 2025-03-07 NOTE — PROGRESS NOTES
preference for salty foods.    2. Diarrhea.  She has been experiencing diarrhea since starting the antibiotic for cellulitis, which could contribute to dehydration. There is no blood in the stool, and it does not have a particularly bad odor. She is advised to increase her water intake to compensate for fluid loss. Over-the-counter Imodium is recommended to manage the diarrhea.    3. Cellulitis.  The cellulitis of her arm has improved but is still present. She has one more day of antibiotics left.    4/5. Suspected urinary tract infection due to dizziness.  A dipstick urine test will be conducted today to rule out a urinary tract infection, as she has been on antibiotics.  I will await urine culture results before deciding that an antibiotic is needed.  We want to avoid unnecessary antibiotics especially with her recent antibiotics and diarrhea since there is C. difficile risk.        No follow-ups on file.   Lars Zamudio MD       The patient (or guardian, if applicable) and other individuals in attendance with the patient were advised that Artificial Intelligence will be utilized during this visit to record and process the conversation to generate a clinical note. The patient (or guardian, if applicable) and other individuals in attendance at the appointment consented to the use of AI, including the recording.

## 2025-03-09 ENCOUNTER — RESULTS FOLLOW-UP (OUTPATIENT)
Dept: FAMILY MEDICINE CLINIC | Age: 89
End: 2025-03-09

## 2025-03-09 LAB — BACTERIA UR CULT: NORMAL

## 2025-04-01 DIAGNOSIS — F33.41 RECURRENT MAJOR DEPRESSIVE DISORDER, IN PARTIAL REMISSION: ICD-10-CM

## 2025-04-01 RX ORDER — VENLAFAXINE HYDROCHLORIDE 75 MG/1
75 CAPSULE, EXTENDED RELEASE ORAL DAILY
Qty: 90 CAPSULE | Refills: 1 | Status: SHIPPED | OUTPATIENT
Start: 2025-04-01

## 2025-04-01 RX ORDER — VENLAFAXINE HYDROCHLORIDE 37.5 MG/1
37.5 CAPSULE, EXTENDED RELEASE ORAL DAILY
Qty: 90 CAPSULE | Refills: 1 | Status: SHIPPED | OUTPATIENT
Start: 2025-04-01

## 2025-05-28 DIAGNOSIS — D68.51 FACTOR V LEIDEN MUTATION: ICD-10-CM

## 2025-05-28 DIAGNOSIS — Z86.718 HISTORY OF RECURRENT DEEP VEIN THROMBOSIS (DVT): ICD-10-CM

## 2025-05-28 RX ORDER — APIXABAN 2.5 MG/1
2.5 TABLET, FILM COATED ORAL 2 TIMES DAILY
Qty: 60 TABLET | Refills: 0 | Status: SHIPPED | OUTPATIENT
Start: 2025-05-28

## 2025-05-28 NOTE — TELEPHONE ENCOUNTER
Patient's daughter called requesting a refill of eliquis sent to [facility]. Daughter states that patient only have enough for 3 days and would also like a weeks worth [facility].

## 2025-06-03 DIAGNOSIS — M81.0 AGE-RELATED OSTEOPOROSIS WITHOUT CURRENT PATHOLOGICAL FRACTURE: ICD-10-CM

## 2025-06-03 RX ORDER — ALENDRONATE SODIUM 70 MG/1
TABLET ORAL
Qty: 12 TABLET | Refills: 0 | Status: SHIPPED | OUTPATIENT
Start: 2025-06-03

## 2025-07-07 ENCOUNTER — OFFICE VISIT (OUTPATIENT)
Dept: FAMILY MEDICINE CLINIC | Age: 89
End: 2025-07-07
Payer: MEDICARE

## 2025-07-07 VITALS
DIASTOLIC BLOOD PRESSURE: 70 MMHG | SYSTOLIC BLOOD PRESSURE: 104 MMHG | BODY MASS INDEX: 26.46 KG/M2 | WEIGHT: 155 LBS | HEART RATE: 79 BPM | HEIGHT: 64 IN | OXYGEN SATURATION: 97 %

## 2025-07-07 DIAGNOSIS — Z00.00 MEDICARE ANNUAL WELLNESS VISIT, SUBSEQUENT: Primary | ICD-10-CM

## 2025-07-07 DIAGNOSIS — R49.9 CHANGE OF VOICE: ICD-10-CM

## 2025-07-07 DIAGNOSIS — M81.0 AGE-RELATED OSTEOPOROSIS WITHOUT CURRENT PATHOLOGICAL FRACTURE: ICD-10-CM

## 2025-07-07 DIAGNOSIS — L21.9 SEBORRHEIC DERMATITIS: ICD-10-CM

## 2025-07-07 DIAGNOSIS — F33.41 RECURRENT MAJOR DEPRESSIVE DISORDER, IN PARTIAL REMISSION: ICD-10-CM

## 2025-07-07 PROCEDURE — 1123F ACP DISCUSS/DSCN MKR DOCD: CPT | Performed by: FAMILY MEDICINE

## 2025-07-07 PROCEDURE — 1160F RVW MEDS BY RX/DR IN RCRD: CPT | Performed by: FAMILY MEDICINE

## 2025-07-07 PROCEDURE — G0439 PPPS, SUBSEQ VISIT: HCPCS | Performed by: FAMILY MEDICINE

## 2025-07-07 PROCEDURE — 99213 OFFICE O/P EST LOW 20 MIN: CPT | Performed by: FAMILY MEDICINE

## 2025-07-07 PROCEDURE — 1159F MED LIST DOCD IN RCRD: CPT | Performed by: FAMILY MEDICINE

## 2025-07-07 RX ORDER — VENLAFAXINE HYDROCHLORIDE 75 MG/1
75 CAPSULE, EXTENDED RELEASE ORAL DAILY
Qty: 90 CAPSULE | Refills: 1 | Status: SHIPPED | OUTPATIENT
Start: 2025-07-07

## 2025-07-07 RX ORDER — KETOCONAZOLE 20 MG/ML
SHAMPOO, SUSPENSION TOPICAL
Qty: 120 ML | Refills: 5 | Status: SHIPPED | OUTPATIENT
Start: 2025-07-07

## 2025-07-07 RX ORDER — VENLAFAXINE HYDROCHLORIDE 37.5 MG/1
37.5 CAPSULE, EXTENDED RELEASE ORAL DAILY
Qty: 90 CAPSULE | Refills: 1 | Status: SHIPPED | OUTPATIENT
Start: 2025-07-07

## 2025-07-07 RX ORDER — ALENDRONATE SODIUM 70 MG/1
TABLET ORAL
Qty: 12 TABLET | Refills: 3 | Status: SHIPPED | OUTPATIENT
Start: 2025-07-07

## 2025-07-07 ASSESSMENT — PATIENT HEALTH QUESTIONNAIRE - PHQ9
5. POOR APPETITE OR OVEREATING: NOT AT ALL
2. FEELING DOWN, DEPRESSED OR HOPELESS: NOT AT ALL
SUM OF ALL RESPONSES TO PHQ QUESTIONS 1-9: 1
SUM OF ALL RESPONSES TO PHQ QUESTIONS 1-9: 1
8. MOVING OR SPEAKING SO SLOWLY THAT OTHER PEOPLE COULD HAVE NOTICED. OR THE OPPOSITE, BEING SO FIGETY OR RESTLESS THAT YOU HAVE BEEN MOVING AROUND A LOT MORE THAN USUAL: NOT AT ALL
4. FEELING TIRED OR HAVING LITTLE ENERGY: SEVERAL DAYS
6. FEELING BAD ABOUT YOURSELF - OR THAT YOU ARE A FAILURE OR HAVE LET YOURSELF OR YOUR FAMILY DOWN: NOT AT ALL
9. THOUGHTS THAT YOU WOULD BE BETTER OFF DEAD, OR OF HURTING YOURSELF: NOT AT ALL
10. IF YOU CHECKED OFF ANY PROBLEMS, HOW DIFFICULT HAVE THESE PROBLEMS MADE IT FOR YOU TO DO YOUR WORK, TAKE CARE OF THINGS AT HOME, OR GET ALONG WITH OTHER PEOPLE: NOT DIFFICULT AT ALL
7. TROUBLE CONCENTRATING ON THINGS, SUCH AS READING THE NEWSPAPER OR WATCHING TELEVISION: NOT AT ALL
1. LITTLE INTEREST OR PLEASURE IN DOING THINGS: NOT AT ALL
SUM OF ALL RESPONSES TO PHQ QUESTIONS 1-9: 1
SUM OF ALL RESPONSES TO PHQ QUESTIONS 1-9: 1
3. TROUBLE FALLING OR STAYING ASLEEP: NOT AT ALL

## 2025-07-07 ASSESSMENT — VISUAL ACUITY
OS_CC: 20/50
OD_CC: 20/50

## 2025-07-07 NOTE — ASSESSMENT & PLAN NOTE
Her depression is currently in partial remission. She will continue her current regimen of venlafaxine 112.5 mg by mouth daily.  2 refills of venlafaxine were sent 1 for 37.5 mg pills and 1 for 75 mg pills.

## 2025-07-07 NOTE — PROGRESS NOTES
7/7/25    Pippa Mcbride  6/7/1933    SUBJECTIVE    HPI - Pippa is a 89 y.o. female who presents today for evaluation of:  Chief Complaint   Patient presents with    Medicare AW     HPI: Change in voice  Cough and hoarse voice. No mucus in throat. Nose runs a lot.     Scalp : Was told by her hairdresser that she has pinkness of her entire scalp.  Previously was given a prescription of ketoconazole 2% shampoo that she thinks helped her scalp.  She would like another such prescription.  She does not have a lot of flakiness to the skin of the scalp    Falls: She has fallen several times but not hurt herself seriously.  She describes situations of the soles of her shoes getting caught on her carpet.  She does not have loose throw rugs.    Review of Systems    Allergies   Allergen Reactions    Codeine Other (See Comments)    Vitamin K     Penicillin G Rash     Event:        Sulfa Antibiotics Rash     Event:          OBJECTIVE    /70   Pulse 79   Ht 1.626 m (5' 4\")   Wt 70.3 kg (155 lb)   SpO2 97%   BMI 26.61 kg/m²     Physical Exam   Constitutional:       General: Not in acute distress.     Appearance: Normal appearance. Not ill-appearing.   Eyes:      General: No scleral icterus.  ENT:  Her voice is a little raspy.  Cardiovascular:      Rate and Rhythm: Normal rate and regular rhythm.      Heart sounds: No murmur heard.   No friction rub. No gallop.    Pulmonary:      Effort: Pulmonary effort is normal. No respiratory distress.      Breath sounds: No wheezing, rhonchi or rales.   Abdominal:      Palpations: Abdomen is soft. There is no mass.      Tenderness: There is no abdominal tenderness.   Musculoskeletal:     Moves all extremities normally.    Skin:     General: Skin is warm.      Coloration: Skin is not jaundiced.  I do not note any definite rash of the scalp but the scalp does have a pink color.  Neurological:      Mental Status: Patient is alert.   Psychiatric:         Behavior: Behavior

## 2025-07-07 NOTE — ASSESSMENT & PLAN NOTE
I am not completely certain of the diagnosis for her scalp but I think it is very reasonable to use ketoconazole shampoo once or perhaps even twice a week.  I will put out a refill.

## 2025-07-07 NOTE — ASSESSMENT & PLAN NOTE
She is stable taking alendronate.  Discussed working on balance.  Offered referral to physical therapy but she declines.  Encouraged her to practice balancing on 1 foot and also encouraged her to practice lifting her knees high such as a marching in place.  I am hopeful that this improves the muscles that are needed to left foot high enough that she does not stumble on her carpets.

## 2025-07-21 ENCOUNTER — OFFICE VISIT (OUTPATIENT)
Dept: FAMILY MEDICINE CLINIC | Age: 89
End: 2025-07-21
Payer: MEDICARE

## 2025-07-21 VITALS
OXYGEN SATURATION: 93 % | HEART RATE: 91 BPM | SYSTOLIC BLOOD PRESSURE: 118 MMHG | DIASTOLIC BLOOD PRESSURE: 66 MMHG | BODY MASS INDEX: 26.26 KG/M2 | WEIGHT: 153 LBS

## 2025-07-21 DIAGNOSIS — H10.023 MUCOPURULENT CONJUNCTIVITIS OF BOTH EYES: Primary | ICD-10-CM

## 2025-07-21 PROCEDURE — 1123F ACP DISCUSS/DSCN MKR DOCD: CPT | Performed by: FAMILY MEDICINE

## 2025-07-21 PROCEDURE — 1159F MED LIST DOCD IN RCRD: CPT | Performed by: FAMILY MEDICINE

## 2025-07-21 PROCEDURE — 99213 OFFICE O/P EST LOW 20 MIN: CPT | Performed by: FAMILY MEDICINE

## 2025-07-21 RX ORDER — OFLOXACIN 3 MG/ML
1 SOLUTION/ DROPS OPHTHALMIC 4 TIMES DAILY
Qty: 5 ML | Refills: 0 | Status: SHIPPED | OUTPATIENT
Start: 2025-07-21

## 2025-07-21 NOTE — PROGRESS NOTES
7/21/25    Pippa Mcbride  6/7/1933    SUBJECTIVE    HPI - Pippa is a 89 y.o. female who presents today for evaluation of:  Chief Complaint   Patient presents with    Conjunctivitis     Both eyes itchy, burning, drainage since Friday         History of Present Illness  The patient is a 89-year-old woman who presents with pink eye.    She reports experiencing itching and pain in both eyes, accompanied by a yellow, sticky discharge. She has been rubbing her eyes, which may have led to the transfer of the condition from one eye to the other. Over the past few days, she has noticed a decline in her vision, even though she recently acquired new glasses that were initially effective. She also mentions a persistent runny nose.      Review of Systems    Allergies   Allergen Reactions    Codeine Other (See Comments)    Vitamin K     Penicillin G Rash     Event:        Sulfa Antibiotics Rash     Event:          OBJECTIVE    /66 (BP Site: Left Upper Arm, Patient Position: Sitting, BP Cuff Size: Medium Adult)   Pulse 91   Wt 69.4 kg (153 lb)   SpO2 93%   BMI 26.26 kg/m²     Physical Exam   Constitutional:       General: Not in acute distress.     Appearance: Normal appearance. Not ill-appearing.   Eyes:      General: No scleral icterus.  Yazan upper and lower eyelids with redness and swelling. EOMI, PERRL. No pericorneal injection.     Pulmonary:      Effort: Pulmonary effort is normal. No respiratory distress.    Musculoskeletal:     Moves all extremities normally.    Skin:     General: Skin is warm.      Coloration: Skin is not jaundiced.   Neurological:      Mental Status: Patient is alert.   Psychiatric:         Behavior: Behavior normal.         Thought Content: Thought content normal.         Judgment: Judgment normal.      Results          ASSESSMENT/PLAN:    1. Mucopurulent conjunctivitis of both eyes  -     ofloxacin (OCUFLOX) 0.3 % solution; Place 1 drop into both eyes 4 times daily For about 6-7 d,

## 2025-08-14 ENCOUNTER — TELEPHONE (OUTPATIENT)
Dept: FAMILY MEDICINE CLINIC | Age: 89
End: 2025-08-14

## 2025-08-14 ENCOUNTER — OFFICE VISIT (OUTPATIENT)
Dept: FAMILY MEDICINE CLINIC | Age: 89
End: 2025-08-14
Payer: MEDICARE

## 2025-08-14 VITALS
DIASTOLIC BLOOD PRESSURE: 80 MMHG | BODY MASS INDEX: 26.26 KG/M2 | WEIGHT: 153 LBS | OXYGEN SATURATION: 98 % | HEART RATE: 73 BPM | TEMPERATURE: 97.1 F | SYSTOLIC BLOOD PRESSURE: 130 MMHG

## 2025-08-14 DIAGNOSIS — D68.51 FACTOR V LEIDEN MUTATION: ICD-10-CM

## 2025-08-14 DIAGNOSIS — Z86.718 HISTORY OF RECURRENT DEEP VEIN THROMBOSIS (DVT): ICD-10-CM

## 2025-08-14 DIAGNOSIS — Z79.01 ANTICOAGULATED: ICD-10-CM

## 2025-08-14 DIAGNOSIS — M17.12 PRIMARY OSTEOARTHRITIS OF LEFT KNEE: Primary | ICD-10-CM

## 2025-08-14 PROCEDURE — 1159F MED LIST DOCD IN RCRD: CPT | Performed by: FAMILY MEDICINE

## 2025-08-14 PROCEDURE — 99214 OFFICE O/P EST MOD 30 MIN: CPT | Performed by: FAMILY MEDICINE

## 2025-08-14 PROCEDURE — 1123F ACP DISCUSS/DSCN MKR DOCD: CPT | Performed by: FAMILY MEDICINE

## 2025-08-14 PROCEDURE — 1160F RVW MEDS BY RX/DR IN RCRD: CPT | Performed by: FAMILY MEDICINE
